# Patient Record
Sex: MALE | Race: BLACK OR AFRICAN AMERICAN | HISPANIC OR LATINO
[De-identification: names, ages, dates, MRNs, and addresses within clinical notes are randomized per-mention and may not be internally consistent; named-entity substitution may affect disease eponyms.]

---

## 2019-02-05 PROBLEM — Z00.00 ENCOUNTER FOR PREVENTIVE HEALTH EXAMINATION: Status: ACTIVE | Noted: 2019-02-05

## 2019-02-25 ENCOUNTER — RECORD ABSTRACTING (OUTPATIENT)
Age: 49
End: 2019-02-25

## 2019-02-25 DIAGNOSIS — Z78.9 OTHER SPECIFIED HEALTH STATUS: ICD-10-CM

## 2019-02-25 DIAGNOSIS — Z82.0 FAMILY HISTORY OF EPILEPSY AND OTHER DISEASES OF THE NERVOUS SYSTEM: ICD-10-CM

## 2019-02-25 DIAGNOSIS — D51.8 OTHER VITAMIN B12 DEFICIENCY ANEMIAS: ICD-10-CM

## 2019-04-26 ENCOUNTER — APPOINTMENT (OUTPATIENT)
Dept: ENDOCRINOLOGY | Facility: CLINIC | Age: 49
End: 2019-04-26
Payer: COMMERCIAL

## 2019-04-26 VITALS
WEIGHT: 245 LBS | SYSTOLIC BLOOD PRESSURE: 110 MMHG | BODY MASS INDEX: 33.18 KG/M2 | HEART RATE: 69 BPM | DIASTOLIC BLOOD PRESSURE: 80 MMHG | HEIGHT: 72 IN

## 2019-04-26 PROCEDURE — 99213 OFFICE O/P EST LOW 20 MIN: CPT

## 2019-04-26 RX ORDER — PIOGLITAZONE 30 MG/1
30 TABLET ORAL
Refills: 0 | Status: DISCONTINUED | COMMUNITY
End: 2019-04-26

## 2019-04-26 RX ORDER — VALSARTAN 80 MG/1
80 TABLET ORAL
Refills: 0 | Status: DISCONTINUED | COMMUNITY
End: 2019-04-26

## 2019-04-26 RX ORDER — PIOGLITAZONE HYDROCHLORIDE 15 MG/1
15 TABLET ORAL
Refills: 0 | Status: DISCONTINUED | COMMUNITY
End: 2019-04-26

## 2019-04-26 RX ORDER — SAXAGLIPTIN 5 MG/1
5 TABLET, FILM COATED ORAL
Refills: 0 | Status: DISCONTINUED | COMMUNITY
End: 2019-04-26

## 2019-04-26 NOTE — PHYSICAL EXAM
[Alert] : alert [No Acute Distress] : no acute distress [Well Nourished] : well nourished [Well Developed] : well developed [Normal Sclera/Conjunctiva] : normal sclera/conjunctiva [EOMI] : extra ocular movement intact [No Proptosis] : no proptosis [Thyroid Not Enlarged] : the thyroid was not enlarged [Normal Oropharynx] : the oropharynx was normal [No Thyroid Nodules] : there were no palpable thyroid nodules [No Respiratory Distress] : no respiratory distress [No Accessory Muscle Use] : no accessory muscle use [Normal Rate] : heart rate was normal  [Clear to Auscultation] : lungs were clear to auscultation bilaterally [Regular Rhythm] : with a regular rhythm [Normal S1, S2] : normal S1 and S2 [Pedal Pulses Normal] : the pedal pulses are present [No Edema] : there was no peripheral edema [Normal Bowel Sounds] : normal bowel sounds [Not Tender] : non-tender [Not Distended] : not distended [Soft] : abdomen soft [Post Cervical Nodes] : posterior cervical nodes [Anterior Cervical Nodes] : anterior cervical nodes [Axillary Nodes] : axillary nodes [Normal] : normal and non tender [Spine Straight] : spine straight [No Spinal Tenderness] : no spinal tenderness [No Stigmata of Cushings Syndrome] : no stigmata of cushings syndrome [Normal Gait] : normal gait [Normal Strength/Tone] : muscle strength and tone were normal [No Rash] : no rash [Normal Reflexes] : deep tendon reflexes were 2+ and symmetric [No Tremors] : no tremors [Oriented x3] : oriented to person, place, and time [Acanthosis Nigricans] : no acanthosis nigricans

## 2019-04-26 NOTE — HISTORY OF PRESENT ILLNESS
[FreeTextEntry1] : April 26, 2019\par \par     PCP: Dr. Brandon Faulkner c/o [facility] [place]\par             Card: Dr. Pernell orta/o [facility] \par             Eyes: Dr. Kylah Gonzales\par \par            CC: "Type 2 Diabetes" (2001) ?NALLELY   Reactive hypoglycemia\par             +Anti-HARRY 65 ab & daughter DM1\par             Low vitamin B12 (neg intrinsic factor and parietal cell ab 2014)\par             Low Vitamin D\par             (Low WBC)\par \par Gets reactive hypoglyemia\par \par Needs refill on metformin  1000 mg BID via [facility]\par OK with\par pioglitazone 15 mg in PM (lowered from 30)\par Januavia 100 mg\par glipizid ER 10 mg in PM\par \par Recent [facility] labs from\par 4/11/2019\par A1c 5.8 %\par  mg/dl \par \par \par Previous notes from [facility] appended below.\par \par  October 4, 2018\par            .\par            PCP: Dr. [facility]ulkner [facility] [place]\par             Card: Dr. Pernell orta/o [facility] \par             Eyes: Dr. Kylah Gonzales\par            CC: "Type 2 Diabetes" (2001) ?NALLELY\par             +Anti-HARRY 65 ab & daughter DM1\par             Low vitamin B12 (neg intrinsic factor and parietal cell ab 2014)\par             Low Vitamin D\par             (Low WBC)\par            .\par            His valsartan was recalled.\par            .\par            Recent [facility] A1c 6.4 % (up from 6.1)\par            urine microalbumin \par            .\par            Actos dose being decreased from 30 to 15 mg in PM\par            Januvia will stay at 100 mg daily. \par            glipizide ER 10 mg in PM\par            metformin 1000 mg BID \par            .\par            .\par            Impression: Diabetes doing very well. \par            .\par            Plan: Same Rx.\par            .Annual eye exam\par            .\par            ==\par            .\par            May 17, 2018\par            .\par            PCP: Dr. Brandon Faulkner c/o [facility] [place]\par             Card: Dr. Pernell Sindhwani c/[facility] \par             Eyes: Dr. Matt [facility]ico [facility]\par            CC: "Type 2 Diabetes" (2001) ?NALLELY\par             +Anti-HARRY 65 ab & daughter DM1\par             Low vitamin B12 (neg intrinsic factor and parietal cell ab 2014)\par             Low Vitamin D\par             (Low WBC)\par            .\par            INTERNET DOWN.\par            .\par            Doing well.\par            .\par            his phone is \par            Rx / Refills Continue Metformin HCl 1000MG, , 1 tablet twice a day \par             Continue GlipiZIDE XL 10 mg, , 1 tablet Once a day in PM \par             Continue Januvia 100 MG, , 1 tablet Once a day \par             Continue Actos 30 MG, , 1 tablet once a day in PM \par            .\par            Dec 14, 2017\par            .\par            PCP: Dr. Brandon Faulkner c/o [facility] [place]\par             Card: Dr. Pernell Kirkpatrick c/[facility] \par             Eyes: Dr. Matt [facility]ico [facility]\par            CC: "Type 2 Diabetes" (2001) ?NALLELY\par             +Anti-HARRY 65 ab & daughter DM1\par             Low vitamin B12 (neg intrinsic factor and parietal cell ab 2014)\par             Low Vitamin D\par             (Low WBC)\par            .\par            Still takiing:.\par            .\par            Actos 30 mg in PM \par            glipizide ER 10 mg in PM\par            Onglyza 5 mg daily-> Januvia 100 mg daily\par            Metformin 1000 mg BID\par            .\par            Labs Nov 30 included\par            microal ratio 5\par            glucose 131 fasting \par            creatinine 1.01 **\par            HbA1c 6.1 %\par            .\par            Impression: Since Actos decreased from 30 to 15 mg the FBS drifted up a bit. Has been too busy to test. \par            .\par            Plan: Discussed [facility].\par            Eye exam.\par            ROV 4 months. \par            .,\par            .\par            ==\par            .\par            August 10, 2017\par            .\par            PCP: Dr. Brandon Faulkner c/o [facility]\par             Card: Dr. Pernell Kirkpatrick c/[facility]\par            CC: "DM2" (2001) ?NALLELY\par             +Anti-HARRY 65 ab & daughter DM1\par             Low vitamin B12 (neg intrinsic factor and parietal cell ab 2014)\par             Low Vitamin D\par             (Low WBC)\par            .\par            .\par            Actos 30 mg in PM \par            glipizide ER 10 mg in PM\par            Onglyza 5 mg daily-> Januvia 100 mg daily\par            Metformin 1000 mg BID\par            .,\par            Takes B12, Vit D OTC daily as well. \par            .\par            Goal will be to taper off of Actos. \par            .\par            Most recent labs ([facility]) from\par            7/27/2017\par            SDV516 mg/dl\par            gastrin 31\par            gliadin ab neg\par            HbA1c 5.9 % *** (as high as 8.0 in August 2011) \par            .\par            Brings in fingerstick BS.\par            FBS .\par            Some LOW ac lunc 49, 56\par            .\par            Impression: Low AC lunch may be related to reactive to \par            oatmeal or delayed effect of PM glipizide ER.\par            .\par            Goal will be to taper off of Actos.\par            .\par            Plan: Increse exercise.\par            Decrease Actos to 15 mg.\par            ROV 3 months after labs.\par            .\par            ==\par            .\par            March 9, 2017\par            .\par            PCP: Dr. Ken Wilson c/[facility]\par             Card: Dr. Pernell Kirkpatrick c/[facility]\par            CC: "DM2" (2001) ?NALLELY\par             +Anti-HARRY 65 ab & daughter DM1\par             Low vitamin B12 (neg intrinsic factor and parietal cell ab 2014)\par             (Low WBC)\par            .\par            Medications currently include:\par            .\par            Actos 30 mg in PM\par            glipizide ER 10 mg in PM\par            Onglyza 5 mg daily-\par            Metformin 1000 mg BID\par            .\par            Also taking metoprolol 25 mg in PM\par            .\par            Impression: Doing well.\par            Fingerstick BS in good range.\par            Hopefully this will be verified by A1c\par            .\par            Plan: Annual eye exam\par            .\par            ==\par            .\par            Nov 10, 2016\par            .\par            PCP: Dr. Ken Wilson c/[facility]\par            CC: "DM2" (2001) ?NALLELY\par             +Anti-HARRY 65 ab & daughter DM1\par             Low vitamin B12 (neg intrinsic factor and parietal cell ab 2014)\par             (Low WBC)\par            .\par            Had panic attacks\par            Told of low K\par            Got too low with 2 glip in PM \par            .\par            Needs Onglyza. \par            On vit D and B12\par            .\par            Impression: Stable.\par            .\par            Plan: Annual eye exam\par            Continue:\par            Actos 30 mg in PM\par            glipizide ER 10 mg in PM\par            Onglyza 5 mg daily\par            Metformin 1000 mg BID\par            .\par            ==\par            .\par            July 21, 2016\par            .\par            PCP: Dr. Ken orta/o [facility]\par            CC: "DM2" (2001) ?NALLELY\par             +Anti-HARRY 65 ab & daughter DM1\par             Low vitamin B12 (neg intrinsic factor and parietal cell ab 2014)\par             (Low WBC)\par            .\par            Last visit advised him to increase PM glipizide ER 10 mg to two pills in AM, but he did not. \par            Did not bring records to this visit. (but he reports sugars in good range on current Rx.\par            .\par            Impression: Recent A1c of 6.2% suggests good control.\par            .\par            Plan: Continue glipizide ER 10 mg at one pill in PM along with the other medications for diabetes. \par            Updated A1c before he returns in3-4 mons. \par            .\par            ==\par            .\par            March 10, 2016\par            .\par            PCP: Dr. Ken orta/o [facility]\par            CC: "DM2" (2001)\par             +Anti-HARRY 65 ab & daughter DM1\par             Low vitamin B12 (neg intrinsic factor and parietal cell ab 2014)\par             (Low WBC)\par            3/3/2016 [facility] labs included\par            .\par             mg/dl\par            Fructosamine 327 (190-270)\par            .\par            HbA1c 7.3 %\par            .\par            Actos 30 mg in PM\par            glipizide ER 10 mg in PM\par            Onglyza 5 mg daily\par            Metformin 1000 mg BID\par            .\par            .\par            Plan: Next labs, check B12, Vit D, WBC, A1c\par            Because of elevated FBS can increase glipizide ER 10 mg to\par            two pills in PM. \par            .\par            "I feel fine, lost some weight...." -Lost 15 lbs since April. \par            .\par            .\par            ==\par            .\par            November 12, 2015\par            .\par            PCP: Dr. Ken Wilson c/o [facility]\par            CC: "DM2" (2001)\par             +Anti-HARRY 65 ab & daughter DM1\par             Low vitamin B12 (neg intrinsic factor and parietal cell ab 2014)\par             (Low WBC)\par            .\par            Labs July 2 included A1c 7.2 %\par            B12 155 (180-9000)\par            WBC 2.4 \par            .\par            Actos 30 mg in PM\par            glipizide ER 5 mg in PM\par            Onglyza 5 mg daily\par            Metformin 1000 mg BID\par            .\par            Impression: 15 yo daughter keeping him busy. She has insulin pump.\par            He has not been testing his own sugars.\par            .\par            Plan: Updated A1c. -jh\par            .\par            .\par            ==\par            .\par            July 2, 2015\par            .\par            .\par            PCP: Dr. 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 Not clear if rise in the FBS is a sign of this, but will increase PM glipizide to 10 mg and ROV in 4 months.\par            .\par            .===\par            Nov 17, 2014\par            PCP: Dr. 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 \par            .\par            Because of +HARRY 65 and daughter's DM1, I encouraged him to\par            see Dr. Imtiaz De La O at [facility] for advice to decrease chances of evolution from DM2 to DM1. He reports that Dr. De La O is not in his network. I will check with [facility] faculty for their advice:\par            [email or web address]\par            [phone]\par            .\par            Remains on:\par            Actos 30 mg in PM\par            glipizide ER 5 mg in PM\par            Onglyza 5 mg daily\par            Metformin 1000 mg BID.\par            .\par            Impression: Doing well. Last labs in May 6.1%\par            .\par            Plan: Same Rx. ROV in 4 months after updated labs. Thank you.\par            .\par            ===\par            .\par            May 27, 2014\par            PCP: Dr. Wilson\par            CC: DM2 (2001);\par            .\par            Last note appended below.\par            On metformin 1000 mg BID\par            Onglyza 5 mg daily\par            glipizide XR 5 mg\par            Actos 30 mg in PM.\par            .\par            Fingerstick BS in good range. FBS about 110 mg/dl average.\par            Sees Ophthalmology at [facility]. \par            .\par            Impression: His daughter has Type 1 Diabetes. His recent blood tests show post breakfast BS of 131 and C-peptide of 5.07 (0.8-3.1) at [facility] suggesting he currently makes considerable insulin and is insulin resistant. 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 For that reason, I suggested to him that he may benefit from evaluation at [facility] (Dr. Vegas fax [phone]) for possible study or strategy to decrease progression to DM1 and he will ask you advice for a referral in that regard. The Anti parietal cell ab is negative. B12 is borderline. \par             Thank you. -jh\par            .\par            =========\par            Dec 16, 2013\par            PCP: Dr. Bhardwaj/Dr. Faulkner\par            CC: DM2, since 2001, low B12\par            .\par            13 yo daughter has DM1 diagnosed age 9 [address] Erickson. \par            He stopped Actos prescriibedd by Dr. Silva b/o concern about bladder cancer. Dr. Silva had him on 30 mg and I restarted at 15 mg, but he wants the 30 mg Actos now that he is back on it.\par            .\par            I switched him to metformin XR 1000 mg BID, but he prefers the regular metformin as it is smaller and less expensive.\par            .\par            Because the FBS (and A1c) have drifted up a bit, I have advised he increase the PM glipizide 2.5 mg XR to 5 mg XR.\par            .\par            He will remain on 5 mg Onglyza every AM.\par            .\par            B12 is only 202. \par            Imp: Lower B12 may be a metformin effect. \par            .\par            Plan: As above.\par            .\par            ====\par            August 19, 2013\par            PCP: Dr. Ken Wilson/Dr. Faulkner\par            CC: DM2\par            .\par            Recent A1c again 5.9%\par            B12 again low\par            .\par            On Onglyza 5 mg\par            Actos 15 in PM\par            Metformin 1000 mg BID\par            glipizide ER 2.5 mg in PM\par            .\par            Brought NO records. \par            Impression: BS in good range.\par            Plan: Same Rx. ROV 4 months.\par            Get B12 shot at [facility]. \par            .\par            ====\par            April 1, 2013 CC: Diabetes \par            Meds the same.\par            A1c is 5.9% B12 183.\par            Doing well. Highest BS is 200 after instant oatmeal.\par            Eyes checked recently at [facility].\par            Feels well.\par            He will hand deliver labs to Dr. Wilson\par            .\par            .\par            ++++++\par            Visits for DM2. Meds the same. Recent A1c 6.1%. Had recent eye exam at [facility]. Feels well. Previous note appended below:\par            .s. \par            .\par            "July 25, 2012 Re: Michael Oscar [birth year]\par            Attn: Dr. Ken Wilson\par            Mr. Moore is 40 yo, father of a 21 and 15 yo, works at the [employer] in  and he returns regarding DM diagnosed in 2001. He is currently taking:\par            metformin 1000 mg BID\par            Onglyza 5 mg qAM\par            Actos 15 mg qPM \par            glipizide 2.5 mg qPM \par            Recent HbA1c is 6.1 (down from 8.0 in August but up from 5.8% last visit). However he did not bring in his records today. \par            No foot discomfort; eye exam a few month ago at [facility]. \par            Weight today is 275 lbs fully clothed, 6 ft tall.\par            Impression: Diabetes appears to be under excellent control\par            Plan: Same Rx. \par            Sincerely,\par            James Hellerman MD\par            Endocrinology [address] [place] [id number]".

## 2019-04-26 NOTE — ASSESSMENT
[FreeTextEntry1] : ~\par FBS drifted up a tad after pioglitazone from 30 to 15.\par Sees podiatrist at [facility].\par ROV in October after labs

## 2019-07-31 ENCOUNTER — RX RENEWAL (OUTPATIENT)
Age: 49
End: 2019-07-31

## 2019-10-17 ENCOUNTER — APPOINTMENT (OUTPATIENT)
Dept: ENDOCRINOLOGY | Facility: CLINIC | Age: 49
End: 2019-10-17
Payer: COMMERCIAL

## 2019-10-17 VITALS
HEIGHT: 72 IN | SYSTOLIC BLOOD PRESSURE: 120 MMHG | BODY MASS INDEX: 33.86 KG/M2 | HEART RATE: 72 BPM | DIASTOLIC BLOOD PRESSURE: 80 MMHG | WEIGHT: 250 LBS

## 2019-10-17 PROCEDURE — 99214 OFFICE O/P EST MOD 30 MIN: CPT

## 2019-10-17 NOTE — PHYSICAL EXAM
[Alert] : alert [No Acute Distress] : no acute distress [Well Developed] : well developed [Well Nourished] : well nourished [Normal Sclera/Conjunctiva] : normal sclera/conjunctiva [No Proptosis] : no proptosis [EOMI] : extra ocular movement intact [Normal Oropharynx] : the oropharynx was normal [Thyroid Not Enlarged] : the thyroid was not enlarged [No Respiratory Distress] : no respiratory distress [No Accessory Muscle Use] : no accessory muscle use [No Thyroid Nodules] : there were no palpable thyroid nodules [Normal Rate] : heart rate was normal  [Normal S1, S2] : normal S1 and S2 [Clear to Auscultation] : lungs were clear to auscultation bilaterally [Pedal Pulses Normal] : the pedal pulses are present [Regular Rhythm] : with a regular rhythm [No Edema] : there was no peripheral edema [No Spinal Tenderness] : no spinal tenderness [No Stigmata of Cushings Syndrome] : no stigmata of cushings syndrome [Spine Straight] : spine straight [Normal Gait] : normal gait [No Rash] : no rash [No Tremors] : no tremors [Oriented x3] : oriented to person, place, and time [Normal Insight/Judgement] : insight and judgment were intact [Normal Affect] : the affect was normal [Normal Mood] : the mood was normal [Acanthosis Nigricans] : no acanthosis nigricans

## 2019-10-17 NOTE — HISTORY OF PRESENT ILLNESS
[FreeTextEntry1] : Oct 17, 2019\par PCP: Dr. Brandon Faulkner c/o [facility]\par             Card: Dr. Pernell orta/o [facility] \par             Eyes: Dr. Kylah Gonzales\par \par            CC: "Type 2 Diabetes" (2001) ?NALLELY   Reactive hypoglycemia\par             +Anti-HARRY 65 ab & daughter DM1\par             Low vitamin B12 (neg intrinsic factor and parietal cell ab 2014)\par             Low Vitamin D\par             (Low WBC)\par \par Feels well.\par Recent CBC at [facility] shows Hct 36and at [facility] in July 2015 Hct was 46.8.\par He will discuss with Dr. Faulkner at upcoming visit.\par ROV here for diabetes in February 2020.   \par \par \par \par April 26, 2019\par \par     PCP: Dr. Brandon Faulkner c/o [facility]\par             Card: Dr. Pernell orta/o [facility] \par             Eyes: Dr. Kylah Gonzales\par \par            CC: "Type 2 Diabetes" (2001) ?NALLELY   Reactive hypoglycemia\par             +Anti-HARRY 65 ab & daughter DM1\par             Low vitamin B12 (neg intrinsic factor and parietal cell ab 2014)\par             Low Vitamin D\par             (Low WBC)\par \par Gets reactive hypoglyemia\par \par Needs refill on metformin  1000 mg BID via [facility]\par OK with\par pioglitazone 15 mg in PM (lowered from 30)\par Januavia 100 mg\par glipizid ER 10 mg in PM\par \par Recent [facility] labs from\par 4/11/2019\par A1c 5.8 %\par  mg/dl \par \par \par Previous notes from [employer] appended below.\par \par  October 4, 2018\par            .\par            PCP: Dr. Brandon Faulkner c/o [facility]\par             Card: Dr. Pernell orta/o [facility] \par             Eyes: Dr. Kylah Gonzales\par            CC: "Type 2 Diabetes" (2001) ?NALLELY\par             +Anti-HARRY 65 ab & daughter DM1\par             Low vitamin B12 (neg intrinsic factor and parietal cell ab 2014)\par             Low Vitamin D\par             (Low WBC)\par            .\par            His valsartan was recalled.\par            .\par            Recent [facility] A1c 6.4 % (up from 6.1)\par            urine microalbumin \par            .\par            Actos dose being decreased from 30 to 15 mg in PM\par            Januvia will stay at 100 mg daily. \par            glipizide ER 10 mg in PM\par            metformin 1000 mg BID \par            .\par            .\par            Impression: Diabetes doing very well. \par            .\par            Plan: Same Rx.\par            .Annual eye exam\par            .\par            ==\par            .\par            May 17, 2018\par            .\par            PCP: Dr. Brandon Faulkner c/o [facility]\par             Card: Dr. Pernell Kirkpatrick c/[facility] \par             Eyes: Dr. Matt rico [facility]\par            CC: "Type 2 Diabetes" (2001) ?NALLELY\par             +Anti-HARRY 65 ab & daughter DM1\par             Low vitamin B12 (neg intrinsic factor and parietal cell ab 2014)\par             Low Vitamin D\par             (Low WBC)\par            .\par            INTERNET DOWN.\par            .\par            Doing well.\par            .\par            his phone is \par            Rx / Refills Continue Metformin HCl 1000MG, , 1 tablet twice a day \par             Continue GlipiZIDE XL 10 mg, , 1 tablet Once a day in PM \par             Continue Januvia 100 MG, , 1 tablet Once a day \par             Continue Actos 30 MG, , 1 tablet once a day in PM \par            .\par            Dec 14, 2017\par            .\par            PCP: Dr. Brandon Faulkner c/o [facility]\par             Card: Dr. Pernell Kirkpatrick c/[facility] \par             Eyes: Dr. Matt rico [facility]\par            CC: "Type 2 Diabetes" (2001) ?NALLELY\par             +Anti-HARRY 65 ab & daughter DM1\par             Low vitamin B12 (neg intrinsic factor and parietal cell ab 2014)\par             Low Vitamin D\par             (Low WBC)\par            .\par            Still takiing:.\par            .\par            Actos 30 mg in PM \par            glipizide ER 10 mg in PM\par            Onglyza 5 mg daily-> Januvia 100 mg daily\par            Metformin 1000 mg BID\par            .\par            Labs Nov 30 included\par            microal ratio 5\par            glucose 131 fasting \par            creatinine 1.01 **\par            HbA1c 6.1 %\par            .\par            Impression: Since Actos decreased from 30 to 15 mg the FBS drifted up a bit. Has been too busy to test. \par            .\par            Plan: Discussed [facility].\par            Eye exam.\par            ROV 4 months. \par            .,\par            .\par            ==\par            .\par            August 10, 2017\par            .\par            PCP: Dr. Brandon Faulkner c/[facility]\par             Card: Dr. Pernell Kirkpatrick c/[facility]\par            CC: "DM2" (2001) ?NALLELY\par             +Anti-HARRY 65 ab & daughter DM1\par             Low vitamin B12 (neg intrinsic factor and parietal cell ab 2014)\par             Low Vitamin D\par             (Low WBC)\par            .\par            .\par            Actos 30 mg in PM \par            glipizide ER 10 mg in PM\par            Onglyza 5 mg daily-> Januvia 100 mg daily\par            Metformin 1000 mg BID\par            .,\par            Takes B12, Vit D OTC daily as well. \par            .\par            Goal will be to taper off of Actos. \par            .\par            Most recent labs ([facility]) from\par            7/27/2017\par            [id number] mg/dl\par            gastrin 31\par            gliadin ab neg\par            HbA1c 5.9 % *** (as high as 8.0 in August 2011) \par            .\par            Brings in fingerstick BS.\par            FBS .\par            Some LOW ac lunc 49, 56\par            .\par            Impression: Low AC lunch may be related to reactive to \par            oatmeal or delayed effect of PM glipizide ER.\par            .\par            Goal will be to taper off of Actos.\par            .\par            Plan: Increse exercise.\par            Decrease Actos to 15 mg.\par            ROV 3 months after labs.\par            .\par            ==\par            .\par            March 9, 2017\par            .\par            PCP: Dr. Ken Wilson c/[facility]\par             Card: Dr. Pernell Kirkpatrick c/o [facility]\par            CC: "DM2" (2001) ?NALLELY\par             +Anti-HARRY 65 ab & daughter DM1\par             Low vitamin B12 (neg intrinsic factor and parietal cell ab 2014)\par             (Low WBC)\par            .\par            Medications currently include:\par            .\par            Actos 30 mg in PM\par            glipizide ER 10 mg in PM\par            Onglyza 5 mg daily-\par            Metformin 1000 mg BID\par            .\par            Also taking metoprolol 25 mg in PM\par            .\par            Impression: Doing well.\par            Fingerstick BS in good range.\par            Hopefully this will be verified by A1c\par            .\par            Plan: Annual eye exam\par            .\par            ==\par            .\par            Nov 10, 2016\par            .\par            PCP: Dr. Ken Wilson [facility]\par            CC: "DM2" (2001) ?NALLELY\par             +Anti-HARRY 65 ab & daughter DM1\par             Low vitamin B12 (neg intrinsic factor and parietal cell ab 2014)\par             (Low WBC)\par            .\par            Had panic attacks\par            Told of low K\par            Got too low with 2 glip in PM \par            .\par            Needs Onglyza. \par            On vit D and B12\par            .\par            Impression: Stable.\par            .\par            Plan: Annual eye exam\par            Continue:\par            Actos 30 mg in PM\par            glipizide ER 10 mg in PM\par            Onglyza 5 mg daily\par            Metformin 1000 mg BID\par            .\par            ==\par            .\par            July 21, 2016\par            .\par            PCP: Dr. Ken orta/o [facility]\par            CC: "DM2" (2001) ?NALLELY\par             +Anti-HARRY 65 ab & daughter DM1\par             Low vitamin B12 (neg intrinsic factor and parietal cell ab 2014)\par             (Low WBC)\par            .\par            Last visit advised him to increase PM glipizide ER 10 mg to two pills in AM, but he did not. \par            Did not bring records to this visit. (but he reports sugars in good range on current Rx.\par            .\par            Impression: Recent A1c of 6.2% suggests good control.\par            .\par            Plan: Continue glipizide ER 10 mg at one pill in PM along with the other medications for diabetes. \par            Updated A1c before he returns in3-4 mons. \par            .\par            ==\par            .\par            March 10, 2016\par            .\par            PCP: Dr. Ken orta/o [facility]\par            CC: "DM2" (2001)\par             +Anti-HARRY 65 ab & daughter DM1\par             Low vitamin B12 (neg intrinsic factor and parietal cell ab 2014)\par             (Low WBC)\par            3/3/2016 [facility] labs included\par            .\par             mg/dl\par            Fructosamine 327 (190-270)\par            .\par            HbA1c 7.3 %\par            .\par            Actos 30 mg in PM\par            glipizide ER 10 mg in PM\par            Onglyza 5 mg daily\par            Metformin 1000 mg BID\par            .\par            .\par            Plan: Next labs, check B12, Vit D, WBC, A1c\par            Because of elevated FBS can increase glipizide ER 10 mg to\par            two pills in PM. \par            .\par            "I feel fine, lost some weight...." -Lost 15 lbs since April. \par            .\par            .\par            ==\par            .\par            November 12, 2015\par            .\par            PCP: Dr. Ken orta/[facility]\par            CC: "DM2" (2001)\par             +Anti-HARRY 65 ab & daughter DM1\par             Low vitamin B12 (neg intrinsic factor and parietal cell ab 2014)\par             (Low WBC)\par            .\par            Labs July 2 included A1c 7.2 %\par            B12 155 (180-9000)\par            WBC 2.4 \par            .\par            Actos 30 mg in PM\par            glipizide ER 5 mg in PM\par            Onglyza 5 mg daily\par            Metformin 1000 mg BID\par            .\par            Impression: 17 yo daughter keeping him busy. She has insulin pump.\par            He has not been testing his own sugars.\par            .\par            Plan: Updated A1c. -jh\par            .\par            .\par            ==\par            .\par            July 2, 2015\par            .\par            .\par            PCP: Dr. 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 Not clear if rise in the FBS is a sign of this, but will increase PM glipizide to 10 mg and ROV in 4 months.\par            .\par            .===\par            Nov 17, 2014\par            PCP: Dr. 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 \par            .\par            Because of +HARRY 65 and daughter's DM1, I encouraged him to\par            see Dr. Imtiaz De La O at [facility] for advice to decrease chances of evolution from DM2 to DM1. He reports that Dr. De La O is not in his network. I will check with [facility] faculty for their advice:\par            [email or web address]\par            [phone]\par            .\par            Remains on:\par            Actos 30 mg in PM\par            glipizide ER 5 mg in PM\par            Onglyza 5 mg daily\par            Metformin 1000 mg BID.\par            .\par            Impression: Doing well. Last labs in May 6.1%\par            .\par            Plan: Same Rx. ROV in 4 months after updated labs. Thank you.\par            .\par            ===\par            .\par            May 27, 2014\par            PCP: Dr. Wilson\par            CC: DM2 (2001);\par            .\par            Last note appended below.\par            On metformin 1000 mg BID\par            Onglyza 5 mg daily\par            glipizide XR 5 mg\par            Actos 30 mg in PM.\par            .\par            Fingerstick BS in good range. FBS about 110 mg/dl average.\par            Sees Op[facility] at [facility]. \par            .\par            Impression: His daughter has Type 1 Diabetes. His recent blood tests show post breakfast BS of 131 and C-peptide of 5.07 (0.8-3.1) at [facility] suggesting he currently makes considerable insulin and is insulin resistant. 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 For that reason, I suggested to him that he may benefit from evaluation at [facility] (Dr. Vegas fax [phone]) for possible study or strategy to decrease progression to DM1 and he will ask you advice for a referral in that regard. The Anti parietal cell ab is negative. B12 is borderline. \par             Thank you. -jh\par            .\par            =========\par            Dec 16, 2013\par            PCP: Dr. Bhardwaj/Dr. Faulkner\par            CC: DM2, since 2001, low B12\par            .\par            13 yo daughter has DM1 diagnosed age 9 [address] Erickson. \par            He stopped Actos prescriibedd by Dr. Silva b/o concern about bladder cancer. Dr. Silva had him on 30 mg and I restarted at 15 mg, but he wants the 30 mg Actos now that he is back on it.\par            .\par            I switched him to metformin XR 1000 mg BID, but he prefers the regular metformin as it is smaller and less expensive.\par            .\par            Because the FBS (and A1c) have drifted up a bit, I have advised he increase the PM glipizide 2.5 mg XR to 5 mg XR.\par            .\par            He will remain on 5 mg Onglyza every AM.\par            .\par            B12 is only 202. \par            Imp: Lower B12 may be a metformin effect. \par            .\par            Plan: As above.\par            .\par            ====\par            August 19, 2013\par            PCP: Dr. Ken Wilson/Dr. Faulkner\par            CC: DM2\par            .\par            Recent A1c again 5.9%\par            B12 again low\par            .\par            On Onglyza 5 mg\par            Actos 15 in PM\par            Metformin 1000 mg BID\par            glipizide ER 2.5 mg in PM\par            .\par            Brought NO records. \par            Impression: BS in good range.\par            Plan: Same Rx. ROV 4 months.\par            Get B12 shot at [facility]. \par            .\par            ====\par            April 1, 2013 CC: Diabetes \par            Meds the same.\par            A1c is 5.9% B12 183.\par            Doing well. Highest BS is 200 after instant oatmeal.\par            Eyes checked recently at [facility].\par            Feels well.\par            He will hand deliver labs to Dr. Wilson\par            .\par            .\par            ++++++\par            Visits for DM2. Meds the same. Recent A1c 6.1%. Had recent eye exam at [facility]. Feels well. Previous note appended below:\par            .s. \par            .\par            "July 25, 2012 Re: Michael Moore [birth year]\par            Attn: Dr. Ken Wilson\par            Mr. Moore is 42 yo, father of a 21 and 15 yo, works at the [employer] in  and he returns regarding DM diagnosed in 2001. He is currently taking:\par            metformin 1000 mg BID\par            Onglyza 5 mg qAM\par            Actos 15 mg qPM \par            glipizide 2.5 mg qPM \par            Recent HbA1c is 6.1 (down from 8.0 in August but up from 5.8% last visit). However he did not bring in his records today. \par            No foot discomfort; eye exam a few month ago at [facility]. \par            Weight today is 275 lbs fully clothed, 6 ft tall.\par            Impression: Diabetes appears to be under excellent control\par            Plan: Same Rx. \par            Sincerely,\par            James Hellerman MD\par            Endocrinology [address] [place] [id number]".

## 2020-04-02 ENCOUNTER — TRANSCRIPTION ENCOUNTER (OUTPATIENT)
Age: 50
End: 2020-04-02

## 2020-04-30 ENCOUNTER — APPOINTMENT (OUTPATIENT)
Dept: ENDOCRINOLOGY | Facility: CLINIC | Age: 50
End: 2020-04-30
Payer: COMMERCIAL

## 2020-04-30 PROCEDURE — 99214 OFFICE O/P EST MOD 30 MIN: CPT | Mod: 95

## 2020-04-30 RX ORDER — GLIPIZIDE 10 MG/1
10 TABLET, FILM COATED, EXTENDED RELEASE ORAL
Qty: 90 | Refills: 4 | Status: DISCONTINUED | COMMUNITY
Start: 2019-07-31 | End: 2020-04-30

## 2020-04-30 NOTE — ASSESSMENT
[FreeTextEntry1] : Doing an excellent job of monitoring diabetes, keeping to appropriate diet.\par A1c in good range.\par ROV 6 months.

## 2020-04-30 NOTE — HISTORY OF PRESENT ILLNESS
[Home] : at home, [unfilled] , at the time of the visit. [Medical Office: ([facility])___] : at the medical office located in  [Patient] : the patient [Self] : self [FreeTextEntry1] : Apr 30, 2020  VideoChat [employer]   android  [phone]  \par \par PCP: Dr. Brandon Faulkner c/o [facility] [place]\par             Card: Dr. Pernell Kirkpatrick c/o [facility] \par             Eyes: Dr. Kylah Gonzales\par \par            CC: "Type 2 Diabetes" (2001) ?NALLELY   Reactive hypoglycemia\par             +Anti-HARRY 65 ab & daughter DM1\par             Low vitamin B12 (neg intrinsic factor and parietal cell ab 2014)\par             Low Vitamin D\par             (Low WBC)\par \par Working from home \par Recent [facility]  by Dr. Faulkner included.\par 2/25/2020 included\par glucose 108 mg/dl  - fasting \par creat 0.99\par ca 9.4\par TP 6.8\par alb 4.4\par low alk phos ***\par WBC 4.1 \par Hct 35.9  ****\par MCV  87.6 \par \par A1c  6.3 ***\par HDL   46\par tri   89\par LDL  65 on atrovastatin\par \par urine micro   9   - ratio\par iron - nl \par ferritin 89 \par \par TSH 1.25\par PSA  0.5 \par \par For diabetes, he is taking:\par \par  metformin  1000 mg BID \par pioglitazone 15 mg in PM (lowered from 30)\par Januavia 100 mg  - needs refill via [place] \par glipizide ER 10 mg in PM\par \par \par Oct 17, 2019\par PCP: Dr. Brandon Faulkner c/o [facility] [place]\par             Card: Dr. Pernell Kirkpatrick [facility]/o [facility] \par             Eyes: Dr. Kylah Gonzales\par \par            CC: "Type 2 Diabetes" (2001) ?NALLELY   Reactive hypoglycemia\par             +Anti-HARRY 65 ab & daughter DM1\par             Low vitamin B12 (neg intrinsic factor and parietal cell ab 2014)\par             Low Vitamin D\par             (Low WBC)\par \par Feels well.\par Recent CBC at [facility] shows Hct 36and at [place] in July 2015 Hct was 46.8.\par He will discuss with Dr. Faulkner at upcoming visit.\par ROV here for diabetes in February 2020.   \par \par \par \par April 26, 2019\par \par     PCP: Dr. Brandon Faulkner c/o [facility] [place]\par             Card: Dr. Pernell orta/o [facility] \par             Eyes: Dr. Kylah Gonzales\par \par            CC: "Type 2 Diabetes" (2001) ?NALLELY   Reactive hypoglycemia\par             +Anti-HARRY 65 ab & daughter DM1\par             Low vitamin B12 (neg intrinsic factor and parietal cell ab 2014)\par             Low Vitamin D\par             (Low WBC)\par \par Gets reactive hypoglyemia\par \par Needs refill on metformin  1000 mg BID via [facility]\par OK with\par pioglitazone 15 mg in PM (lowered from 30)\par Januavia 100 mg\par glipizid ER 10 mg in PM\par \par Recent [facility] labs from\par 4/11/2019\par A1c 5.8 %\par  mg/dl \par \par \par Previous notes from [employer] appended below.\par \par  October 4, 2018\par            .\par            PCP: Dr. Brandon Faulkner [facility] [place]\par             Card: Dr. Pernell orta/o [facility] \par             Eyes: Dr. Kylah Gonzales\par            CC: "Type 2 Diabetes" (2001) ?NALLELY\par             +Anti-HARRY 65 ab & daughter DM1\par             Low vitamin B12 (neg intrinsic factor and parietal cell ab 2014)\par             Low Vitamin D\par             (Low WBC)\par            .\par            His valsartan was recalled.\par            .\par            Recent [facility] A1c 6.4 % (up from 6.1)\par            urine microalbumin \par            .\par            Actos dose being decreased from 30 to 15 mg in PM\par            Januvia will stay at 100 mg daily. \par            glipizide ER 10 mg in PM\par            metformin 1000 mg BID \par            .\par            .\par            Impression: Diabetes doing very well. \par            .\par            Plan: Same Rx.\par            .Annual eye exam\par            .\par            ==\par            .\par            May 17, 2018\par            .\par            PCP: Dr. Brandon Faulkner c/o [facility] [place]\par             Card: Dr. Pernell orta/o [facility] \par             Eyes: Dr. Matt rico [facility]\par            CC: "Type 2 Diabetes" (2001) ?NLALELY\par             +Anti-HARRY 65 ab & daughter DM1\par             Low vitamin B12 (neg intrinsic factor and parietal cell ab 2014)\par             Low Vitamin D\par             (Low WBC)\par            .\par            INTERNET DOWN.\par            .\par            Doing well.\par            .\par            his phone is \par            Rx / Refills Continue Metformin HCl 1000MG, , 1 tablet twice a day \par             Continue GlipiZIDE XL 10 mg, , 1 tablet Once a day in PM \par             Continue Januvia 100 MG, , 1 tablet Once a day \par             Continue Actos 30 MG, , 1 tablet once a day in PM \par            .\par            Dec 14, 2017\par            .\par            PCP: Dr. Brandon Faulkner c/o [facility] [place]\par             Card: Dr. Pernell orta/[facility] \par             Eyes: Dr. Matt rico [facility]\par            CC: "Type 2 Diabetes" (2001) ?NALLELY\par             +Anti-HARRY 65 ab & daughter DM1\par             Low vitamin B12 (neg intrinsic factor and parietal cell ab 2014)\par             Low Vitamin D\par             (Low WBC)\par            .\par            Still takiing:.\par            .\par            Actos 30 mg in PM \par            glipizide ER 10 mg in PM\par            Onglyza 5 mg daily-> Januvia 100 mg daily\par            Metformin 1000 mg BID\par            .\par            Labs Nov 30 included\par            microal ratio 5\par            glucose 131 fasting \par            creatinine 1.01 **\par            HbA1c 6.1 %\par            .\par            Impression: Since Actos decreased from 30 to 15 mg the FBS drifted up a bit. Has been too busy to test. \par            .\par            Plan: Discussed [facility].\par            Eye exam.\par            ROV 4 months. \par            .,\par            .\par            ==\par            .\par            August 10, 2017\par            .\par            PCP: Dr. Brandon Faulkner c/o [facility]\par             Card: Dr. Pernell orta/o [facility]\par            CC: "DM2" (2001) ?NALLELY\par             +Anti-HARRY 65 ab & daughter DM1\par             Low vitamin B12 (neg intrinsic factor and parietal cell ab 2014)\par             Low Vitamin D\par             (Low WBC)\par            .\par            .\par            Actos 30 mg in PM \par            glipizide ER 10 mg in PM\par            Onglyza 5 mg daily-> Januvia 100 mg daily\par            Metformin 1000 mg BID\par            .,\par            Takes B12, Vit D OTC daily as well. \par            .\par            Goal will be to taper off of Actos. \par            .\par            Most recent labs ([facility]) from\par            7/27/2017\par            [id number] mg/dl\par            gastrin 31\par            gliadin ab neg\par            HbA1c 5.9 % *** (as high as 8.0 in August 2011) \par            .\par            Brings in fingerstick BS.\par            FBS .\par            Some LOW ac lunc 49, 56\par            .\par            Impression: Low AC lunch may be related to reactive to \par            oatmeal or delayed effect of PM glipizide ER.\par            .\par            Goal will be to taper off of Actos.\par            .\par            Plan: Increse exercise.\par            Decrease Actos to 15 mg.\par            ROV 3 months after labs.\par            .\par            ==\par            .\par            March 9, 2017\par            .\par            PCP: Dr. Ken Wilson c/o [facility]\par             Card: Dr. Pernell Kirkpatrick [facility]\par            CC: "DM2" (2001) ?NALLELY\par             +Anti-HARRY 65 ab & daughter DM1\par             Low vitamin B12 (neg intrinsic factor and parietal cell ab 2014)\par             (Low WBC)\par            .\par            Medications currently include:\par            .\par            Actos 30 mg in PM\par            glipizide ER 10 mg in PM\par            Onglyza 5 mg daily-\par            Metformin 1000 mg BID\par            .\par            Also taking metoprolol 25 mg in PM\par            .\par            Impression: Doing well.\par            Fingerstick BS in good range.\par            Hopefully this will be verified by A1c\par            .\par            Plan: Annual eye exam\par            .\par            ==\par            .\par            Nov 10, 2016\par            .\par            PCP: Dr. Ken Wilson [facility]\par            CC: "DM2" (2001) ?NALLELY\par             +Anti-HARRY 65 ab & daughter DM1\par             Low vitamin B12 (neg intrinsic factor and parietal cell ab 2014)\par             (Low WBC)\par            .\par            Had panic attacks\par            Told of low K\par            Got too low with 2 glip in PM \par            .\par            Needs Onglyza. \par            On vit D and B12\par            .\par            Impression: Stable.\par            .\par            Plan: Annual eye exam\par            Continue:\par            Actos 30 mg in PM\par            glipizide ER 10 mg in PM\par            Onglyza 5 mg daily\par            Metformin 1000 mg BID\par            .\par            ==\par            .\par            July 21, 2016\par            .\par            PCP: Dr. Ken Wilson [facility]\par            CC: "DM2" (2001) ?NALLELY\par             +Anti-HARRY 65 ab & daughter DM1\par             Low vitamin B12 (neg intrinsic factor and parietal cell ab 2014)\par             (Low WBC)\par            .\par            Last visit advised him to increase PM glipizide ER 10 mg to two pills in AM, but he did not. \par            Did not bring records to this visit. (but he reports sugars in good range on current Rx.\par            .\par            Impression: Recent A1c of 6.2% suggests good control.\par            .\par            Plan: Continue glipizide ER 10 mg at one pill in PM along with the other medications for diabetes. \par            Updated A1c before he returns in3-4 mons. \par            .\par            ==\par            .\par            March 10, 2016\par            .\par            PCP: Dr. Ken Wilson [facility]\par            CC: "DM2" (2001)\par             +Anti-HARRY 65 ab & daughter DM1\par             Low vitamin B12 (neg intrinsic factor and parietal cell ab 2014)\par             (Low WBC)\par            3/3/2016 [facility] labs included\par            .\par             mg/dl\par            Fructosamine 327 (190-270)\par            .\par            HbA1c 7.3 %\par            .\par            Actos 30 mg in PM\par            glipizide ER 10 mg in PM\par            Onglyza 5 mg daily\par            Metformin 1000 mg BID\par            .\par            .\par            Plan: Next labs, check B12, Vit D, WBC, A1c\par            Because of elevated FBS can increase glipizide ER 10 mg to\par            two pills in PM. \par            .\par            "I feel fine, lost some weight...." -Lost 15 lbs since April. \par            .\par            .\par            ==\par            .\par            November 12, 2015\par            .\par            PCP: Dr. Ken Wilson [facility]\par            CC: "DM2" (2001)\par             +Anti-HARRY 65 ab & daughter DM1\par             Low vitamin B12 (neg intrinsic factor and parietal cell ab 2014)\par             (Low WBC)\par            .\par            Labs July 2 included A1c 7.2 %\par            B12 155 (180-9000)\par            WBC 2.4 \par            .\par            Actos 30 mg in PM\par            glipizide ER 5 mg in PM\par            Onglyza 5 mg daily\par            Metformin 1000 mg BID\par            .\par            Impression: 17 yo daughter keeping him busy. She has insulin pump.\par            He has not been testing his own sugars.\par            .\par            Plan: Updated A1c. -jh\par            .\par            .\par            ==\par            .\par            July 2, 2015\par            .\par            .\par            PCP: Dr. 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 Not clear if rise in the FBS is a sign of this, but will increase PM glipizide to 10 mg and ROV in 4 months.\par            .\par            .===\par            Nov 17, 2014\par            PCP: Dr. 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 \par            .\par            Because of +HARRY 65 and daughter's DM1, I encouraged him to\par            see Dr. Imtiaz De La O at [facility] for advice to decrease chances of evolution from DM2 to DM1. He reports that Dr. De La O is not in his network. I will check with [facility] faculty for their advice:\par            [email or web address]            [phone]\par            .\par            Remains on:\par            Actos 30 mg in PM\par            glipizide ER 5 mg in PM\par            Onglyza 5 mg daily\par            Metformin 1000 mg BID.\par            .\par            Impression: Doing well. Last labs in May 6.1%\par            .\par            Plan: Same Rx. ROV in 4 months after updated labs. Thank you.\par            .\par            ===\par            .\par            May 27, 2014\par            PCP: Dr. Wilson\par            CC: DM2 (2001);\par            .\par            Last note appended below.\par            On metformin 1000 mg BID\par            Onglyza 5 mg daily\par            glipizide XR 5 mg\par            Actos 30 mg in PM.\par            .\par            Fingerstick BS in good range. FBS about 110 mg/dl average.\par            Sees [facility]mology at [facility]. \par            .\par            Impression: His daughter has Type 1 Diabetes. His recent blood tests show post breakfast BS of 131 and C-peptide of 5.07 (0.8-3.1) at [facility] suggesting he currently makes considerable insulin and is insulin resistant. 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 For that reason, I suggested to him that he may benefit from evaluation at [facility] (Dr. Vegas fax [phone]) for possible study or strategy to decrease progression to DM1 and he will ask you advice for a referral in that regard. The Anti parietal cell ab is negative. B12 is borderline. \par             Thank you. -\par            .\par            =========\par            Dec 16, 2013\par            PCP: Dr. Bhardwaj/Dr. Faulkner\par            CC: DM2, since 2001, low B12\par            .\par            15 yo daughter has DM1 diagnosed age 9 [address] Erickson. \par            He stopped Actos prescriibedd by Dr. Silva b/o concern about bladder cancer. Dr. Silva had him on 30 mg and I restarted at 15 mg, but he wants the 30 mg Actos now that he is back on it.\par            .\par            I switched him to metformin XR 1000 mg BID, but he prefers the regular metformin as it is smaller and less expensive.\par            .\par            Because the FBS (and A1c) have drifted up a bit, I have advised he increase the PM glipizide 2.5 mg XR to 5 mg XR.\par            .\par            He will remain on 5 mg Onglyza every AM.\par            .\par            B12 is only 202. \par            Imp: Lower B12 may be a metformin effect. \par            .\par            Plan: As above.\par            .\par            ====\par            August 19, 2013\par            PCP: Dr. Ken Wilson/Dr. Faulkner\par            CC: DM2\par            .\par            Recent A1c again 5.9%\par            B12 again low\par            .\par            On Onglyza 5 mg\par            Actos 15 in PM\par            Metformin 1000 mg BID\par            glipizide ER 2.5 mg in PM\par            .\par            Brought NO records. \par            Impression: BS in good range.\par            Plan: Same Rx. ROV 4 months.\par            Get B12 shot at [facility]. \par            .\par            ====\par            April 1, 2013 CC: Diabetes \par            Meds the same.\par            A1c is 5.9% B12 183.\par            Doing well. Highest BS is 200 after instant oatmeal.\par            Eyes checked recently at [facility].\par            Feels well.\par            He will hand deliver labs to Dr. Wilson\par            .\par            .\par            ++++++\par            Visits for DM2. Meds the same. Recent A1c 6.1%. Had recent eye exam at [facility]. Feels well. Previous note appended below:\par            .s. \par            .\par            "July 25, 2012 Re: Michael Moore [birth year]\par            Attn: Dr. Ken Wilson\par            Mr. Moore is 42 yo, father of a 21 and 15 yo, works at the [employer] in  and he returns regarding DM diagnosed in 2001. He is currently taking:\par            metformin 1000 mg BID\par            Onglyza 5 mg qAM\par            Actos 15 mg qPM \par            glipizide 2.5 mg qPM \par            Recent HbA1c is 6.1 (down from 8.0 in August but up from 5.8% last visit). However he did not bring in his records today. \par            No foot discomfort; eye exam a few month ago at [facility]. \par            Weight today is 275 lbs fully clothed, 6 ft tall.\par            Impression: Diabetes appears to be under excellent control\par            Plan: Same Rx. \par            Sincerely,\par            James Hellerman MD\par            Endocrinology [address] [place] [address]".

## 2021-01-07 ENCOUNTER — APPOINTMENT (OUTPATIENT)
Dept: ENDOCRINOLOGY | Facility: CLINIC | Age: 51
End: 2021-01-07

## 2021-02-12 ENCOUNTER — APPOINTMENT (OUTPATIENT)
Dept: ENDOCRINOLOGY | Facility: CLINIC | Age: 51
End: 2021-02-12
Payer: COMMERCIAL

## 2021-02-12 PROCEDURE — 99214 OFFICE O/P EST MOD 30 MIN: CPT | Mod: 95

## 2021-02-12 NOTE — HISTORY OF PRESENT ILLNESS
[Home] : at home, [unfilled] , at the time of the visit. [Medical Office: ([facility])___] : at the medical office located in  [Verbal consent obtained from patient] : the patient, [unfilled] [FreeTextEntry1] : Feb 12, 2021    VideoChat  \par \par PCP: Dr. Brandon Faulkner c/o [facility] [place]\par             Card: Dr. Pernell Kirkpatrick c/o [facility] \par             Eyes: Dr. Kylah Gonzales\par \par            CC: "Type 2 Diabetes" (2001) ?NALLELY   Reactive hypoglycemia\par             +Anti-HARRY 65 ab & daughter DM1\par             Low vitamin B12 (neg intrinsic factor and parietal cell ab 2014)\par             Low Vitamin D\par             (Low WBC)\par \par  metformin  1000 mg BID \par pioglitazone 15 mg in PM (lowered from 30)\par Januavia 100 mg  - needs refill via [facility] \par glipizide ER 10 mg in PM\par \par \par \par \par Apr 30, 2020  VideoChat [employer]   android  [phone]  \par \par PCP: Dr. Brandon Faulkner c/o [facility] [place]\par             Card: Dr. Pernell Kirkpatrick c/o [facility] \par             Eyes: Dr. Kylah Gonzales\par \par            CC: "Type 2 Diabetes" (2001) ?NALLELY   Reactive hypoglycemia\par             +Anti-HARRY 65 ab & daughter DM1\par             Low vitamin B12 (neg intrinsic factor and parietal cell ab 2014)\par             Low Vitamin D\par             (Low WBC)\par \par Working from home \par Recent [facility]  by Dr. Faulkner included.\par 2/25/2020 included\par glucose 108 mg/dl  - fasting \par creat 0.99\par ca 9.4\par TP 6.8\par alb 4.4\par low alk phos ***\par WBC 4.1 \par Hct 35.9  ****\par MCV  87.6 \par \par A1c  6.3 ***\par HDL   46\par tri   89\par LDL  65 on atrovastatin\par \par urine micro   9   - ratio\par iron - nl \par ferritin 89 \par \par TSH 1.25\par PSA  0.5 \par \par For diabetes, he is taking:\par \par  metformin  1000 mg BID \par pioglitazone 15 mg in PM (lowered from 30)\par Januavia 100 mg  - needs refill via [facility] \par glipizide ER 10 mg in PM\par \par \par Oct 17, 2019\par PCP: Dr. Brandon Faulkner c/o [facility] [place]\par             Card: Dr. Pernell orta/o [facility] \par             Eyes: Dr. Kylah Gonzales\par \par            CC: "Type 2 Diabetes" (2001) ?NALLELY   Reactive hypoglycemia\par             +Anti-HARRY 65 ab & daughter DM1\par             Low vitamin B12 (neg intrinsic factor and parietal cell ab 2014)\par             Low Vitamin D\par             (Low WBC)\par \par Feels well.\par Recent CBC at [facility] shows Hct 36and at [place] in July 2015 Hct was 46.8.\par He will discuss with Dr. Faulkner at upcoming visit.\par ROV here for diabetes in February 2020.   \par \par \par \par April 26, 2019\par \par     PCP: Dr. Brandon Faulkner c/o [facility] [place]\par             Card: Dr. Pernell orta/mary ellen MEJIAs \par             Eyes: Dr. Kylah Gonzales\par \par            CC: "Type 2 Diabetes" (2001) ?NALLELY   Reactive hypoglycemia\par             +Anti-HARRY 65 ab & daughter DM1\par             Low vitamin B12 (neg intrinsic factor and parietal cell ab 2014)\par             Low Vitamin D\par             (Low WBC)\par \par Gets reactive hypoglyemia\par \par Needs refill on metformin  1000 mg BID via [facility]\par OK with\par pioglitazone 15 mg in PM (lowered from 30)\par Januavia 100 mg\par glipizid ER 10 mg in PM\par \par Recent [facility] labs from\par 4/11/2019\par A1c 5.8 %\par  mg/dl \par \par \par Previous notes from [employer] appended below.\par \par  October 4, 2018\par            .\par            PCP: Dr. Brandon Faulkner c/o [facility] [place]\par             Card: Dr. Pernell orta/mary ellen [facility] \par             Eyes: Dr. Kylah Gonzales\par            CC: "Type 2 Diabetes" (2001) ?NALLELY\par             +Anti-HARRY 65 ab & daughter DM1\par             Low vitamin B12 (neg intrinsic factor and parietal cell ab 2014)\par             Low Vitamin D\par             (Low WBC)\par            .\par            His valsartan was recalled.\par            .\par            Recent [facility] A1c 6.4 % (up from 6.1)\par            urine microalbumin \par            .\par            Actos dose being decreased from 30 to 15 mg in PM\par            Januvia will stay at 100 mg daily. \par            glipizide ER 10 mg in PM\par            metformin 1000 mg BID \par            .\par            .\par            Impression: Diabetes doing very well. \par            .\par            Plan: Same Rx.\par            .Annual eye exam\par            .\par            ==\par            .\par            May 17, 2018\par            .\par            PCP: Dr. Brandon Faulkner c/o [facility] [place]\par             Card: Dr. Pernell Kirkpatrick c/o [facility] \par             Eyes: Dr. Matt rico [facility]\par            CC: "Type 2 Diabetes" (2001) ?NALLELY\par             +Anti-HARRY 65 ab & daughter DM1\par             Low vitamin B12 (neg intrinsic factor and parietal cell ab 2014)\par             Low Vitamin D\par             (Low WBC)\par            .\par            INTERNET DOWN.\par            .\par            Doing well.\par            .\par            his phone is \par            Rx / Refills Continue Metformin HCl 1000MG, , 1 tablet twice a day \par             Continue GlipiZIDE XL 10 mg, , 1 tablet Once a day in PM \par             Continue Januvia 100 MG, , 1 tablet Once a day \par             Continue Actos 30 MG, , 1 tablet once a day in PM \par            .\par            Dec 14, 2017\par            .\par            PCP: Dr. Brandon Faulkner c/o [facility] [place]\par             Card: Dr. Pernell Kirkpatrick c/[facility] \par             Eyes: Dr. Matt rico [facility]\par            CC: "Type 2 Diabetes" (2001) ?NALLELY\par             +Anti-HARRY 65 ab & daughter DM1\par             Low vitamin B12 (neg intrinsic factor and parietal cell ab 2014)\par             Low Vitamin D\par             (Low WBC)\par            .\par            Still takiing:.\par            .\par            Actos 30 mg in PM \par            glipizide ER 10 mg in PM\par            Onglyza 5 mg daily-> Januvia 100 mg daily\par            Metformin 1000 mg BID\par            .\par            Labs Nov 30 included\par            microal ratio 5\par            glucose 131 fasting \par            creatinine 1.01 **\par            HbA1c 6.1 %\par            .\par            Impression: Since Actos decreased from 30 to 15 mg the FBS drifted up a bit. Has been too busy to test. \par            .\par            Plan: Discussed [facility].\par            Eye exam.\par            ROV 4 months. \par            .,\par            .\par            ==\par            .\par            August 10, 2017\par            .\par            PCP: Dr. Brandon Faulkner c/o [facility]\par             Card: Dr. Pernell Kirkpatrick c/o [facility]\par            CC: "DM2" (2001) ?NALLELY\par             +Anti-HARRY 65 ab & daughter DM1\par             Low vitamin B12 (neg intrinsic factor and parietal cell ab 2014)\par             Low Vitamin D\par             (Low WBC)\par            .\par            .\par            Actos 30 mg in PM \par            glipizide ER 10 mg in PM\par            Onglyza 5 mg daily-> Januvia 100 mg daily\par            Metformin 1000 mg BID\par            .,\par            Takes B12, Vit D OTC daily as well. \par            .\par            Goal will be to taper off of Actos. \par            .\par            Most recent labs ([facility]) from\par            7/27/2017\par            [id number] mg/dl\par            gastrin 31\par            gliadin ab neg\par            HbA1c 5.9 % *** (as high as 8.0 in August 2011) \par            .\par            Brings in fingerstick BS.\par            FBS .\par            Some LOW ac lunc 49, 56\par            .\par            Impression: Low AC lunch may be related to reactive to \par            oatmeal or delayed effect of PM glipizide ER.\par            .\par            Goal will be to taper off of Actos.\par            .\par            Plan: Increse exercise.\par            Decrease Actos to 15 mg.\par            ROV 3 months after labs.\par            .\par            ==\par            .\par            March 9, 2017\par            .\par            PCP: Dr. Ken Wilson c/o [facility]\par             Card: Dr. Pernell Kirkpatrick c/[facility]\par            CC: "DM2" (2001) ?NALLELY\par             +Anti-HARRY 65 ab & daughter DM1\par             Low vitamin B12 (neg intrinsic factor and parietal cell ab 2014)\par             (Low WBC)\par            .\par            Medications currently include:\par            .\par            Actos 30 mg in PM\par            glipizide ER 10 mg in PM\par            Onglyza 5 mg daily-\par            Metformin 1000 mg BID\par            .\par            Also taking metoprolol 25 mg in PM\par            .\par            Impression: Doing well.\par            Fingerstick BS in good range.\par            Hopefully this will be verified by A1c\par            .\par            Plan: Annual eye exam\par            .\par            ==\par            .\par            Nov 10, 2016\par            .\par            PCP: Dr. Ken Wilson c/o [facility]\par            CC: "DM2" (2001) ?NALLELY\par             +Anti-HARRY 65 ab & daughter DM1\par             Low vitamin B12 (neg intrinsic factor and parietal cell ab 2014)\par             (Low WBC)\par            .\par            Had panic attacks\par            Told of low K\par            Got too low with 2 glip in PM \par            .\par            Needs Onglyza. \par            On vit D and B12\par            .\par            Impression: Stable.\par            .\par            Plan: Annual eye exam\par            Continue:\par            Actos 30 mg in PM\par            glipizide ER 10 mg in PM\par            Onglyza 5 mg daily\par            Metformin 1000 mg BID\par            .\par            ==\par            .\par            July 21, 2016\par            .\par            PCP: Dr. Ken Wilson c/[facility]\par            CC: "DM2" (2001) ?NALLELY\par             +Anti-HARRY 65 ab & daughter DM1\par             Low vitamin B12 (neg intrinsic factor and parietal cell ab 2014)\par             (Low WBC)\par            .\par            Last visit advised him to increase PM glipizide ER 10 mg to two pills in AM, but he did not. \par            Did not bring records to this visit. (but he reports sugars in good range on current Rx.\par            .\par            Impression: Recent A1c of 6.2% suggests good control.\par            .\par            Plan: Continue glipizide ER 10 mg at one pill in PM along with the other medications for diabetes. \par            Updated A1c before he returns in3-4 mons. \par            .\par            ==\par            .\par            March 10, 2016\par            .\par            PCP: Dr. Ken Wilson c/o [facility]\par            CC: "DM2" (2001)\par             +Anti-HARRY 65 ab & daughter DM1\par             Low vitamin B12 (neg intrinsic factor and parietal cell ab 2014)\par             (Low WBC)\par            3/3/2016 [facility] labs included\par            .\par             mg/dl\par            Fructosamine 327 (190-270)\par            .\par            HbA1c 7.3 %\par            .\par            Actos 30 mg in PM\par            glipizide ER 10 mg in PM\par            Onglyza 5 mg daily\par            Metformin 1000 mg BID\par            .\par            .\par            Plan: Next labs, check B12, Vit D, WBC, A1c\par            Because of elevated FBS can increase glipizide ER 10 mg to\par            two pills in PM. \par            .\par            "I feel fine, lost some weight...." -Lost 15 lbs since April. \par            .\par            .\par            ==\par            .\par            November 12, 2015\par            .\par            PCP: Dr. Ken orta/mary [facility]\par            CC: "DM2" (2001)\par             +Anti-HARRY 65 ab & daughter DM1\par             Low vitamin B12 (neg intrinsic factor and parietal cell ab 2014)\par             (Low WBC)\par            .\par            Labs July 2 included A1c 7.2 %\par            B12 155 (180-9000)\par            WBC 2.4 \par            .\par            Actos 30 mg in PM\par            glipizide ER 5 mg in PM\par            Onglyza 5 mg daily\par            Metformin 1000 mg BID\par            .\par            Impression: 17 yo daughter keeping him busy. She has insulin pump.\par            He has not been testing his own sugars.\par            .\par            Plan: Updated A1c. -jh\par            .\par            .\par            ==\par            .\par            July 2, 2015\par            .\par            .\par            PCP: Dr. 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 Not clear if rise in the FBS is a sign of this, but will increase PM glipizide to 10 mg and ROV in 4 months.\par            .\par            .===\par            Nov 17, 2014\par            PCP: Dr. 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 \par            .\par            Because of +HARRY 65 and daughter's DM1, I encouraged him to\par            see Dr. Imtiaz De La O at [facility] for advice to decrease chances of evolution from DM2 to DM1. He reports that Dr. De La O is not in his network. I will check with [facility] faculty for their advice:\par            [email or web address]\par            [phone]\par            .\par            Remains on:\par            Actos 30 mg in PM\par            glipizide ER 5 mg in PM\par            Onglyza 5 mg daily\par            Metformin 1000 mg BID.\par            .\par            Impression: Doing well. Last labs in May 6.1%\par            .\par            Plan: Same Rx. ROV in 4 months after updated labs. Thank you.\par            .\par            ===\par            .\par            May 27, 2014\par            PCP: Dr. Wilson\par            CC: DM2 (2001);\par            .\par            Last note appended below.\par            On metformin 1000 mg BID\par            Onglyza 5 mg daily\par            glipizide XR 5 mg\par            Actos 30 mg in PM.\par            .\par            Fingerstick BS in good range. FBS about 110 mg/dl average.\par            Sees Ophthalmology at [facility]. \par            .\par            Impression: His daughter has Type 1 Diabetes. His recent blood tests show post breakfast BS of 131 and C-peptide of 5.07 (0.8-3.1) at [facility] suggesting he currently makes considerable insulin and is insulin resistant. 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 For that reason, I suggested to him that he may benefit from evaluation at [facility] (Dr. Vegas fax [phone]) for possible study or strategy to decrease progression to DM1 and he will ask you advice for a referral in that regard. The Anti parietal cell ab is negative. B12 is borderline. \par             Thank you. -jh\par            .\par            =========\par            Dec 16, 2013\par            PCP: Dr. Bhardwaj/Dr. Faulkner\par            CC: DM2, since 2001, low B12\par            .\par            13 yo daughter has DM1 diagnosed age 9 [address] Erickson. \par            He stopped Actos prescriibedd by Dr. Silva b/o concern about bladder cancer. Dr. Silva had him on 30 mg and I restarted at 15 mg, but he wants the 30 mg Actos now that he is back on it.\par            .\par            I switched him to metformin XR 1000 mg BID, but he prefers the regular metformin as it is smaller and less expensive.\par            .\par            Because the FBS (and A1c) have drifted up a bit, I have advised he increase the PM glipizide 2.5 mg XR to 5 mg XR.\par            .\par            He will remain on 5 mg Onglyza every AM.\par            .\par            B12 is only 202. \par            Imp: Lower B12 may be a metformin effect. \par            .\par            Plan: As above.\par            .\par            ====\par            August 19, 2013\par            PCP: Dr. Ken Wilson/Dr. Faulkner\par            CC: DM2\par            .\par            Recent A1c again 5.9%\par            B12 again low\par            .\par            On Onglyza 5 mg\par            Actos 15 in PM\par            Metformin 1000 mg BID\par            glipizide ER 2.5 mg in PM\par            .\par            Brought NO records. \par            Impression: BS in good range.\par            Plan: Same Rx. ROV 4 months.\par            Get B12 shot at [facility]. \par            .\par            ====\par            April 1, 2013 CC: Diabetes \par            Meds the same.\par            A1c is 5.9% B12 183.\par            Doing well. Highest BS is 200 after instant oatmeal.\par            Eyes checked recently at [facility].\par            Feels well.\par            He will hand deliver labs to Dr. Wilson\par            .\par            .\par            ++++++\par            Visits for DM2. Meds the same. Recent A1c 6.1%. Had recent eye exam at [facility]. Feels well. Previous note appended below:\par            .s. \par            .\par            "July 25, 2012 Re: Michael Moore [birth year]\par            Attn: Dr. Ken Wilson\par            Mr. Moore is 40 yo, father of a 21 and 15 yo, works at the [employer] in  and he returns regarding DM diagnosed in 2001. He is currently taking:\par            metformin 1000 mg BID\par            Onglyza 5 mg qAM\par            Actos 15 mg qPM \par            glipizide 2.5 mg qPM \par            Recent HbA1c is 6.1 (down from 8.0 in August but up from 5.8% last visit). However he did not bring in his records today. \par            No foot discomfort; eye exam a few month ago at [facility]. \par            Weight today is 275 lbs fully clothed, 6 ft tall.\par            Impression: Diabetes appears to be under excellent control\par            Plan: Same Rx. \par            Sincerely,\par            James Hellerman MD\par            Endocrinology [address] [place] [id number]".

## 2021-02-12 NOTE — ASSESSMENT
[FreeTextEntry1] : Diabetes under good control.\par Occasional mild hypoglycemia, sometimes may be reactive (e.g., after oatmeal)

## 2021-03-25 ENCOUNTER — APPOINTMENT (OUTPATIENT)
Dept: ENDOCRINOLOGY | Facility: CLINIC | Age: 51
End: 2021-03-25
Payer: COMMERCIAL

## 2021-03-25 PROCEDURE — 99214 OFFICE O/P EST MOD 30 MIN: CPT | Mod: 95

## 2021-03-25 NOTE — HISTORY OF PRESENT ILLNESS
[FreeTextEntry1] : Mar 25, 2021     Videochat\par \par PCP: Dr. Brandon Faulkner c/o [facility]    Feb 2020  and plans to see soon \par             Card: Dr. Pernell Kirkpatrick c/o [facility] \par             Eyes: Dr. Kylah Gonzales   - Sept 2020 \par \par            CC: "Type 2 Diabetes" (2001) ?NALLELY   Reactive hypoglycemia**\par             +Anti-HARRY 65 ab & daughter DM1\par             Low vitamin B12 (neg intrinsic factor and parietal cell ab 2014)\par             Low Vitamin D\par             (Low WBC)\par \par  metformin  1000 mg BID \par pioglitazone 15 mg in PM (lowered from 30)\par Januavia 100 mg  - needs refill via [facility] \par glipizide ER 10 mg in PM\par \par Reports fingerstick sugars are doing well.  \par 3/16/2021  [facility]\par \par LDL 79 on atorvastatin\par glucose 88 mg/dl\par A1c 5.3 %     highest  after cereal \par Hct 33.9  **   (had been ~37 in 2013)\par \par 25 OH vit D 39 \par \par \par Impression:  BS in excellent range.\par Reason for low Hct not apparent.\par May be stuttering slowly down.\par Will ask him to see Hematology regarcing the anemia.\par Updated labs before next visit\par \par \par \par \par Feb 12, 2021    VideoChat  \par \par PCP: Dr. Brandon Faulkner c/o [facility]\par             Card: Dr. Pernell Kirkpatrick c/o [facility] \par             Eyes: Dr. Kylah Gonzales\par \par            CC: "Type 2 Diabetes" (2001) ?NALLELY   Reactive hypoglycemia\par             +Anti-HARRY 65 ab & daughter DM1\par             Low vitamin B12 (neg intrinsic factor and parietal cell ab 2014)\par             Low Vitamin D\par             (Low WBC)\par \par  metformin  1000 mg BID \par pioglitazone 15 mg in PM (lowered from 30)\par Januavia 100 mg  - needs refill via [facility] \par glipizide ER 10 mg in PM\par \par \par \par \par Apr 30, 2020  VideoChat [employer]   android    \par \par PCP: Dr. Brandon Faulkner c/o [facility]\par             Card: Dr. Pernell Kirkpatrick [facility]/o [facility] \par             Eyes: Dr. Kylah Gonzales\par \par            CC: "Type 2 Diabetes" (2001) ?NALLELY   Reactive hypoglycemia\par             +Anti-HARRY 65 ab & daughter DM1\par             Low vitamin B12 (neg intrinsic factor and parietal cell ab 2014)\par             Low Vitamin D\par             (Low WBC)\par \par Working from home \par Recent [facility]  by Dr. Faulkner included.\par 2/25/2020 included\par glucose 108 mg/dl  - fasting \par creat 0.99\par ca 9.4\par TP 6.8\par alb 4.4\par low alk phos ***\par WBC 4.1 \par Hct 35.9  ****\par MCV  87.6 \par \par A1c  6.3 ***\par HDL   46\par tri   89\par LDL  65 on atrovastatin\par \par urine micro   9   - ratio\par iron - nl \par ferritin 89 \par \par TSH 1.25\par PSA  0.5 \par \par For diabetes, he is taking:\par \par  metformin  1000 mg BID \par pioglitazone 15 mg in PM (lowered from 30)\par Januavia 100 mg  - needs refill via [facility] \par glipizide ER 10 mg in PM\par \par \par Oct 17, 2019\par PCP: Dr. Brandon Faulkner c/o [facility]\par             Card: Dr. Pernell Kirkpatrick [facility]/o [facility] \par             Eyes: Dr. Kylah Gonzales\par \par            CC: "Type 2 Diabetes" (2001) ?NALLELY   Reactive hypoglycemia\par             +Anti-HARRY 65 ab & daughter DM1\par             Low vitamin B12 (neg intrinsic factor and parietal cell ab 2014)\par             Low Vitamin D\par             (Low WBC)\par \par Feels well.\par Recent CBC at [facility] shows Hct 36and at [place] in July 2015 Hct was 46.8.\par He will discuss with Dr. Faulkner at upcoming visit.\par ROV here for diabetes in February 2020.   \par \par \par \par April 26, 2019\par \par     PCP: Dr. Brandon Faulkner c/o [facility]\par             Card: Dr. Pernell orta/o [facility] \par             Eyes: Dr. Kylah Gonzales\par \par            CC: "Type 2 Diabetes" (2001) ?NALLELY   Reactive hypoglycemia\par             +Anti-HARRY 65 ab & daughter DM1\par             Low vitamin B12 (neg intrinsic factor and parietal cell ab 2014)\par             Low Vitamin D\par             (Low WBC)\par \par Gets reactive hypoglyemia\par \par Needs refill on metformin  1000 mg BID via [facility]\par OK with\par pioglitazone 15 mg in PM (lowered from 30)\par Januavia 100 mg\par glipizid ER 10 mg in PM\par \par Recent [facility] labs from\par 4/11/2019\par A1c 5.8 %\par  mg/dl \par \par \par Previous notes from [employer] appended below.\par \par  October 4, 2018\par            .\par            PCP: Dr. Brandon Faulkner c/o [facility]\par             Card: Dr. Pernell orta/o [facility] \par             Eyes: Dr. Kylah Gonzales\par            CC: "Type 2 Diabetes" (2001) ?NALLELY\par             +Anti-HARRY 65 ab & daughter DM1\par             Low vitamin B12 (neg intrinsic factor and parietal cell ab 2014)\par             Low Vitamin D\par             (Low WBC)\par            .\par            His valsartan was recalled.\par            .\par            Recent [facility] A1c 6.4 % (up from 6.1)\par            urine microalbumin \par            .\par            Actos dose being decreased from 30 to 15 mg in PM\par            Januvia will stay at 100 mg daily. \par            glipizide ER 10 mg in PM\par            metformin 1000 mg BID \par            .\par            .\par            Impression: Diabetes doing very well. \par            .\par            Plan: Same Rx.\par            .Annual eye exam\par            .\par            ==\par            .\par            May 17, 2018\par            .\par            PCP: Dr. Brandon Faulkner c/o [facility]\par             Card: Dr. Pernell orta/o [facility] \par             Eyes: Dr. Matt rico [facility]\par            CC: "Type 2 Diabetes" (2001) ?NALLELY\par             +Anti-HARRY 65 ab & daughter DM1\par             Low vitamin B12 (neg intrinsic factor and parietal cell ab 2014)\par             Low Vitamin D\par             (Low WBC)\par            .\par            INTERNET DOWN.\par            .\par            Doing well.\par            .\par            his phone is \par            Rx / Refills Continue Metformin HCl 1000MG, , 1 tablet twice a day \par             Continue GlipiZIDE XL 10 mg, , 1 tablet Once a day in PM \par             Continue Januvia 100 MG, , 1 tablet Once a day \par             Continue Actos 30 MG, , 1 tablet once a day in PM \par            .\par            Dec 14, 2017\par            .\par            PCP: Dr. Brandon Faulkner c/o [facility]\par             Card: Dr. Pernell Kirkpatrick [facility]/o [facility] \par             Eyes: Dr. Matt rico [facility]\par            CC: "Type 2 Diabetes" (2001) ?NALLELY\par             +Anti-HARRY 65 ab & daughter DM1\par             Low vitamin B12 (neg intrinsic factor and parietal cell ab 2014)\par             Low Vitamin D\par             (Low WBC)\par            .\par            Still takiing:.\par            .\par            Actos 30 mg in PM \par            glipizide ER 10 mg in PM\par            Onglyza 5 mg daily-> Januvia 100 mg daily\par            Metformin 1000 mg BID\par            .\par            Labs Nov 30 included\par            microal ratio 5\par            glucose 131 fasting \par            creatinine 1.01 **\par            HbA1c 6.1 %\par            .\par            Impression: Since Actos decreased from 30 to 15 mg the FBS drifted up a bit. Has been too busy to test. \par            .\par            Plan: Discussed [facility].\par            Eye exam.\par            ROV 4 months. \par            .,\par            .\par            ==\par            .\par            August 10, 2017\par            .\par            PCP: Dr. Brandon Faulkner [facility]\par             Card: Dr. Pernell Kirkpatrick [facility]\par            CC: "DM2" (2001) ?NALLELY\par             +Anti-HARRY 65 ab & daughter DM1\par             Low vitamin B12 (neg intrinsic factor and parietal cell ab 2014)\par             Low Vitamin D\par             (Low WBC)\par            .\par            .\par            Actos 30 mg in PM \par            glipizide ER 10 mg in PM\par            Onglyza 5 mg daily-> Januvia 100 mg daily\par            Metformin 1000 mg BID\par            .,\par            Takes B12, Vit D OTC daily as well. \par            .\par            Goal will be to taper off of Actos. \par            .\par            Most recent labs ([facility]) from\par            7/27/2017\par            [id number] mg/dl\par            gastrin 31\par            gliadin ab neg\par            HbA1c 5.9 % *** (as high as 8.0 in August 2011) \par            .\par            Brings in fingerstick BS.\par            FBS .\par            Some LOW ac lunc 49, 56\par            .\par            Impression: Low AC lunch may be related to reactive to \par            oatmeal or delayed effect of PM glipizide ER.\par            .\par            Goal will be to taper off of Actos.\par            .\par            Plan: Increse exercise.\par            Decrease Actos to 15 mg.\par            ROV 3 months after labs.\par            .\par            ==\par            .\par            March 9, 2017\par            .\par            PCP: Dr. Ken Wilson [facility]/o [facility]\par             Card: Dr. Pernell Kirkpatrick [facility]\par            CC: "DM2" (2001) ?NALLELY\par             +Anti-HARRY 65 ab & daughter DM1\par             Low vitamin B12 (neg intrinsic factor and parietal cell ab 2014)\par             (Low WBC)\par            .\par            Medications currently include:\par            .\par            Actos 30 mg in PM\par            glipizide ER 10 mg in PM\par            Onglyza 5 mg daily-\par            Metformin 1000 mg BID\par            .\par            Also taking metoprolol 25 mg in PM\par            .\par            Impression: Doing well.\par            Fingerstick BS in good range.\par            Hopefully this will be verified by A1c\par            .\par            Plan: Annual eye exam\par            .\par            ==\par            .\par            Nov 10, 2016\par            .\par            PCP: Dr. Ken Wilson [facility]\par            CC: "DM2" (2001) ?NALLELY\par             +Anti-HARRY 65 ab & daughter DM1\par             Low vitamin B12 (neg intrinsic factor and parietal cell ab 2014)\par             (Low WBC)\par            .\par            Had panic attacks\par            Told of low K\par            Got too low with 2 glip in PM \par            .\par            Needs Onglyza. \par            On vit D and B12\par            .\par            Impression: Stable.\par            .\par            Plan: Annual eye exam\par            Continue:\par            Actos 30 mg in PM\par            glipizide ER 10 mg in PM\par            Onglyza 5 mg daily\par            Metformin 1000 mg BID\par            .\par            ==\par            .\par            July 21, 2016\par            .\par            PCP: Dr. Ken Wilson [facility]\par            CC: "DM2" (2001) ?NALLELY\par             +Anti-HARRY 65 ab & daughter DM1\par             Low vitamin B12 (neg intrinsic factor and parietal cell ab 2014)\par             (Low WBC)\par            .\par            Last visit advised him to increase PM glipizide ER 10 mg to two pills in AM, but he did not. \par            Did not bring records to this visit. (but he reports sugars in good range on current Rx.\par            .\par            Impression: Recent A1c of 6.2% suggests good control.\par            .\par            Plan: Continue glipizide ER 10 mg at one pill in PM along with the other medications for diabetes. \par            Updated A1c before he returns in3-4 mons. \par            .\par            ==\par            .\par            March 10, 2016\par            .\par            PCP: Dr. Ken Wilson [facility]\par            CC: "DM2" (2001)\par             +Anti-HARRY 65 ab & daughter DM1\par             Low vitamin B12 (neg intrinsic factor and parietal cell ab 2014)\par             (Low WBC)\par            3/3/2016 [facility] labs included\par            .\par             mg/dl\par            Fructosamine 327 (190-270)\par            .\par            HbA1c 7.3 %\par            .\par            Actos 30 mg in PM\par            glipizide ER 10 mg in PM\par            Onglyza 5 mg daily\par            Metformin 1000 mg BID\par            .\par            .\par            Plan: Next labs, check B12, Vit D, WBC, A1c\par            Because of elevated FBS can increase glipizide ER 10 mg to\par            two pills in PM. \par            .\par            "I feel fine, lost some weight...." -Lost 15 lbs since April. \par            .\par            .\par            ==\par            .\par            November 12, 2015\par            .\par            PCP: Dr. Ken Wilson [facility]\par            CC: "DM2" (2001)\par             +Anti-HARRY 65 ab & daughter DM1\par             Low vitamin B12 (neg intrinsic factor and parietal cell ab 2014)\par             (Low WBC)\par            .\par            Labs July 2 included A1c 7.2 %\par            B12 155 (180-9000)\par            WBC 2.4 \par            .\par            Actos 30 mg in PM\par            glipizide ER 5 mg in PM\par            Onglyza 5 mg daily\par            Metformin 1000 mg BID\par            .\par            Impression: 17 yo daughter keeping him busy. She has insulin pump.\par            He has not been testing his own sugars.\par            .\par            Plan: Updated A1c. -jh\par            .\par            .\par            ==\par            .\par            July 2, 2015\par            .\par            .\par            PCP: Dr. 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 Not clear if rise in the FBS is a sign of this, but will increase PM glipizide to 10 mg and ROV in 4 months.\par            .\par            .===\par            Nov 17, 2014\par            PCP: Dr. 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 \par            .\par            Because of +HARRY 65 and daughter's DM1, I encouraged him to\par            see Dr. Imtiaz De La O at [facility] for advice to decrease chances of evolution from DM2 to DM1. He reports that Dr. De La O is not in his network. I will check with [facility] faculty for their advice:\par            [email or web address]\par            [phone]\par            .\par            Remains on:\par            Actos 30 mg in PM\par            glipizide ER 5 mg in PM\par            Onglyza 5 mg daily\par            Metformin 1000 mg BID.\par            .\par            Impression: Doing well. Last labs in May 6.1%\par            .\par            Plan: Same Rx. ROV in 4 months after updated labs. Thank you.\par            .\par            ===\par            .\par            May 27, 2014\par            PCP: Dr. Wilson\par            CC: DM2 (2001);\par            .\par            Last note appended below.\par            On metformin 1000 mg BID\par            Onglyza 5 mg daily\par            glipizide XR 5 mg\par            Actos 30 mg in PM.\par            .\par            Fingerstick BS in good range. FBS about 110 mg/dl average.\par            Sees Ophthalmology at [facility]. \par            .\par            Impression: His daughter has Type 1 Diabetes. His recent blood tests show post breakfast BS of 131 and C-peptide of 5.07 (0.8-3.1) at [facility] suggesting he currently makes considerable insulin and is insulin resistant. 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 For that reason, I suggested to him that he may benefit from evaluation at [facility] (Dr. Vegas fax [phone]) for possible study or strategy to decrease progression to DM1 and he will ask you advice for a referral in that regard. The Anti parietal cell ab is negative. B12 is borderline. \par             Thank you. -jh\par            .\par            =========\par            Dec 16, 2013\par            PCP: Dr. Bhardwaj/Dr. Faulkner\par            CC: DM2, since 2001, low B12\par            .\par            15 yo daughter has DM1 diagnosed age 9 [address] Erickson. \par            He stopped Actos prescriibedd by Dr. Silva b/o concern about bladder cancer. Dr. Silva had him on 30 mg and I restarted at 15 mg, but he wants the 30 mg Actos now that he is back on it.\par            .\par            I switched him to metformin XR 1000 mg BID, but he prefers the regular metformin as it is smaller and less expensive.\par            .\par            Because the FBS (and A1c) have drifted up a bit, I have advised he increase the PM glipizide 2.5 mg XR to 5 mg XR.\par            .\par            He will remain on 5 mg Onglyza every AM.\par            .\par            B12 is only 202. \par            Imp: Lower B12 may be a metformin effect. \par            .\par            Plan: As above.\par            .\par            ====\par            August 19, 2013\par            PCP: Dr. Ken Wilson/Dr. Faulkner\par            CC: DM2\par            .\par            Recent A1c again 5.9%\par            B12 again low\par            .\par            On Onglyza 5 mg\par            Actos 15 in PM\par            Metformin 1000 mg BID\par            glipizide ER 2.5 mg in PM\par            .\par            Brought NO records. \par            Impression: BS in good range.\par            Plan: Same Rx. ROV 4 months.\par            Get B12 shot at [facility]. \par            .\par            ====\par            April 1, 2013 CC: Diabetes \par            Meds the same.\par            A1c is 5.9% B12 183.\par            Doing well. Highest BS is 200 after instant oatmeal.\par            Eyes checked recently at [facility].\par            Feels well.\par            He will hand deliver labs to Dr. Wilson\par            .\par            .\par            ++++++\par            Visits for DM2. Meds the same. Recent A1c 6.1%. Had recent eye exam at [facility]. Feels well. Previous note appended below:\par            .s. \par            .\par            "July 25, 2012 Re: Michael Moore [birth year]\par            Attn: Dr. Ken Wilson\par            Mr. Moore is 42 yo, father of a 21 and 15 yo, works at the [employer] in  and he returns regarding DM diagnosed in 2001. He is currently taking:\par            metformin 1000 mg BID\par            Onglyza 5 mg qAM\par            Actos 15 mg qPM \par            glipizide 2.5 mg qPM \par            Recent HbA1c is 6.1 (down from 8.0 in August but up from 5.8% last visit). However he did not bring in his records today. \par            No foot discomfort; eye exam a few month ago at [facility]. \par            Weight today is 275 lbs fully clothed, 6 ft tall.\par            Impression: Diabetes appears to be under excellent control\par            Plan: Same Rx. \par            Sincerely,\par            James Hellerman MD\par            Endocrinology [address] [place] [id number]".

## 2021-03-25 NOTE — ASSESSMENT
[FreeTextEntry1] : A1c and fingerstick sugars have been in excellent range.\par He may be able to take less medication.\par Annual eye exam.\par

## 2021-04-19 ENCOUNTER — RESULT REVIEW (OUTPATIENT)
Age: 51
End: 2021-04-19

## 2021-04-19 ENCOUNTER — APPOINTMENT (OUTPATIENT)
Dept: HEMATOLOGY ONCOLOGY | Facility: CLINIC | Age: 51
End: 2021-04-19
Payer: COMMERCIAL

## 2021-04-19 VITALS
HEART RATE: 85 BPM | RESPIRATION RATE: 16 BRPM | TEMPERATURE: 98.3 F | BODY MASS INDEX: 31.91 KG/M2 | DIASTOLIC BLOOD PRESSURE: 72 MMHG | SYSTOLIC BLOOD PRESSURE: 123 MMHG | OXYGEN SATURATION: 99 % | WEIGHT: 233 LBS | HEIGHT: 71.85 IN

## 2021-04-19 DIAGNOSIS — Z86.39 PERSONAL HISTORY OF OTHER ENDOCRINE, NUTRITIONAL AND METABOLIC DISEASE: ICD-10-CM

## 2021-04-19 DIAGNOSIS — Z87.898 PERSONAL HISTORY OF OTHER SPECIFIED CONDITIONS: ICD-10-CM

## 2021-04-19 PROCEDURE — 99072 ADDL SUPL MATRL&STAF TM PHE: CPT

## 2021-04-19 PROCEDURE — 99245 OFF/OP CONSLTJ NEW/EST HI 55: CPT

## 2021-04-19 RX ORDER — METOPROLOL TARTRATE 25 MG/1
25 TABLET, FILM COATED ORAL
Refills: 0 | Status: ACTIVE | COMMUNITY
Start: 2021-04-19

## 2021-04-19 RX ORDER — FAMOTIDINE 20 MG/1
20 TABLET, FILM COATED ORAL
Refills: 0 | Status: ACTIVE | COMMUNITY
Start: 2021-04-19

## 2021-04-19 RX ORDER — MELATONIN 3 MG
25 MCG TABLET ORAL
Refills: 0 | Status: ACTIVE | COMMUNITY
Start: 2021-04-19

## 2021-04-19 NOTE — HISTORY OF PRESENT ILLNESS
[de-identified] : Mr. Moore is a 50 year old man who presents for initial consultation of anemia.\par labs 3/2021: hgb 11.5, hct 33.9, ferritin 113, iron sat 28%; he reports he thinks he has been anemic for about 20 years, he has received vitamin B12 injections; he denies needing PO iron, IV iron, and blood transfusions.\par \par He reports feeling dizzy when going from bent to standing, but otherwise well.\par \par Had covid vaccine 2nd shot last monday. Currently feeling well\par \par Family History: mother not in life; 2 daughters alive and well\par denies history bleeding/clotting\par \par He currently works for human [employer] for the [employer]par \par Health Maintenance:\par Colonoscopy performed about 1 year ago Dr. Faulkner ([facility])

## 2021-04-19 NOTE — ASSESSMENT
[FreeTextEntry1] : #Anemia\par We have discussed the differential diagnosis of anemia.\par Will also rule out causes of anemia including nutritional deficiencies, thyroid dysfunction, hemolysis and hematologic disorders. Will check CBC with diff, CMP, LDH, Haptoglobin, Debo, B12, Folate, TSH, retic ct, Epo, PS, ESR/CRP, Immunoglobulins, FLACO, B12/Folate\par \par \par #DM 2\par A1C 5.3 - controlled\par follows with Dr. Hellerman\par On metformin, glipizide, actos and jenuvia\par \par RTC in 1 week to review findings

## 2021-04-19 NOTE — CONSULT LETTER
[Dear  ___] : Dear  [unfilled], [Consult Letter:] : I had the pleasure of evaluating your patient, [unfilled]. [Please see my note below.] : Please see my note below. [Consult Closing:] : Thank you very much for allowing me to participate in the care of this patient.  If you have any questions, please do not hesitate to contact me. [Sincerely,] : Sincerely, [FreeTextEntry3] : Kylah Stafford DO, FACCARMEN, FACP\par Medical Oncology and Hematology\par [facility] [place]\par

## 2021-04-27 ENCOUNTER — APPOINTMENT (OUTPATIENT)
Dept: HEMATOLOGY ONCOLOGY | Facility: CLINIC | Age: 51
End: 2021-04-27
Payer: COMMERCIAL

## 2021-04-27 DIAGNOSIS — N17.9 ACUTE KIDNEY FAILURE, UNSPECIFIED: ICD-10-CM

## 2021-04-27 PROCEDURE — 99214 OFFICE O/P EST MOD 30 MIN: CPT | Mod: 95

## 2021-04-27 NOTE — ASSESSMENT
[FreeTextEntry1] : #Anemia 2/2 hgb S trait\par will give folic acid\par no evidence of hemolysis\par b12 and iron wnl - continue supplementation\par \par #leukopenia\par mild. will recheck at [facility]\par \par #DM 2\par A1C 5.3 - controlled\par follows with Dr. Hellerman\par On metformin, glipizide, actos and jenuvia\par \par #BECCA on CKD\par encourage hydration\par \par RTC in 2 months with cbc with diff, CMP

## 2021-04-27 NOTE — CONSULT LETTER
[Dear  ___] : Dear  [unfilled], [Consult Letter:] : I had the pleasure of evaluating your patient, [unfilled]. [Please see my note below.] : Please see my note below. [Consult Closing:] : Thank you very much for allowing me to participate in the care of this patient.  If you have any questions, please do not hesitate to contact me. [Sincerely,] : Sincerely, [FreeTextEntry3] : Kylah Stafford DO, FACCARMEN, FACP\par Medical Oncology and Hematology\par [facility] [place]\par

## 2021-04-27 NOTE — HISTORY OF PRESENT ILLNESS
[Home] : at home, [unfilled] , at the time of the visit. [Medical Office: ([facility])___] : at the medical office located in  [Verbal consent obtained from patient] : the patient, [unfilled] [de-identified] : Patient is f/u via telehealth regarding anemia. He reports no new or worsening physical issues at this time.  [de-identified] : Mr. Moore is a 50 year old man who presents for initial consultation of anemia.\par labs 3/2021: hgb 11.5, hct 33.9, ferritin 113, iron sat 28%; he reports he thinks he has been anemic for about 20 years, he has received vitamin B12 injections; he denies needing PO iron, IV iron, and blood transfusions.\par \par He reports feeling dizzy when going from bent to standing, but otherwise well.\par \par Had covid vaccine 2nd shot last monday. Currently feeling well\par \par Family History: mother not in life; 2 daughters alive and well\par denies history bleeding/clotting\par \par He currently works for human [employer] for the [employer]\par \par Health Maintenance:\par Colonoscopy performed about 1 year ago Dr. Faulkner ([facility])

## 2021-06-21 ENCOUNTER — RESULT REVIEW (OUTPATIENT)
Age: 51
End: 2021-06-21

## 2021-06-21 ENCOUNTER — APPOINTMENT (OUTPATIENT)
Dept: HEMATOLOGY ONCOLOGY | Facility: CLINIC | Age: 51
End: 2021-06-21
Payer: COMMERCIAL

## 2021-06-21 VITALS
RESPIRATION RATE: 16 BRPM | OXYGEN SATURATION: 100 % | DIASTOLIC BLOOD PRESSURE: 69 MMHG | HEART RATE: 75 BPM | BODY MASS INDEX: 32.18 KG/M2 | SYSTOLIC BLOOD PRESSURE: 117 MMHG | HEIGHT: 71.85 IN | TEMPERATURE: 97.8 F | WEIGHT: 235 LBS

## 2021-06-21 VITALS
DIASTOLIC BLOOD PRESSURE: 69 MMHG | TEMPERATURE: 97.8 F | HEIGHT: 71.85 IN | OXYGEN SATURATION: 100 % | WEIGHT: 235 LBS | RESPIRATION RATE: 16 BRPM | BODY MASS INDEX: 32.18 KG/M2 | HEART RATE: 75 BPM | SYSTOLIC BLOOD PRESSURE: 117 MMHG

## 2021-06-21 PROCEDURE — 99072 ADDL SUPL MATRL&STAF TM PHE: CPT

## 2021-06-21 PROCEDURE — 99214 OFFICE O/P EST MOD 30 MIN: CPT

## 2021-06-21 NOTE — ASSESSMENT
[FreeTextEntry1] : #Anemia 2/2 hgb S trait\par will give folic acid - refilled\par no evidence of hemolysis\par b12 and iron wnl - continue supplementation\par TSH wnl\par immunofixation no monoclonal band\par given that he is on metformin will continue to check B12\par may have to consider BM in future\par \par #leukopenia with lymphopenia\par mild. will recheck at [place]. will check JESSEE\par JESSEE, EBV, HEP, HIV, ESR/CRP\par \par #DM 2\par A1C 5.3 - controlled\par follows with Dr. Hellerman\par On metformin, glipizide, actos and jenuvia\par \par \par RTC in 3 months with cbc with diff, CMP, b12, JESSEE, EBV, HEP, HIV, ESR/CRP,

## 2021-06-21 NOTE — CONSULT LETTER
[Dear  ___] : Dear  [unfilled], [Consult Letter:] : I had the pleasure of evaluating your patient, [unfilled]. [Please see my note below.] : Please see my note below. [Consult Closing:] : Thank you very much for allowing me to participate in the care of this patient.  If you have any questions, please do not hesitate to contact me. [Sincerely,] : Sincerely, [FreeTextEntry3] : Kylah Stafford DO, FACCARMEN, FACP\par Medical Oncology and Hematology\par [facility] [place]\par

## 2021-06-21 NOTE — HISTORY OF PRESENT ILLNESS
[Home] : at home, [unfilled] , at the time of the visit. [Medical Office: ([facility])___] : at the medical office located in  [Verbal consent obtained from patient] : the patient, [unfilled] [de-identified] : Mr. Moore is a 50 year old man who presents for initial consultation of anemia.\par labs 3/2021: hgb 11.5, hct 33.9, ferritin 113, iron sat 28%; he reports he thinks he has been anemic for about 20 years, he has received vitamin B12 injections; he denies needing PO iron, IV iron, and blood transfusions.\par \par He reports feeling dizzy when going from bent to standing, but otherwise well.\par \par Had covid vaccine 2nd shot last monday. Currently feeling well\par \par Family History: mother not in life; 2 daughters alive and well\par denies history bleeding/clotting\par \par He currently works for [employer] for the [employer]\par \par Health Maintenance:\par Colonoscopy performed about 1 year ago Dr. Faulkner ([facility])  [de-identified] : Patient seen and examined and here today for follow up\par Feeling well\par Taking folic acid daily\par denies fatigue, cp, sob, PICA

## 2021-08-05 ENCOUNTER — APPOINTMENT (OUTPATIENT)
Dept: ENDOCRINOLOGY | Facility: CLINIC | Age: 51
End: 2021-08-05
Payer: COMMERCIAL

## 2021-08-05 PROCEDURE — 99214 OFFICE O/P EST MOD 30 MIN: CPT | Mod: 95

## 2021-08-05 NOTE — HISTORY OF PRESENT ILLNESS
[FreeTextEntry1] : Aug 05, 2021      Amwell\par \par PCP: Dr. Brandon Faulkner c/o [facility]    Feb 2020  and plans to see soon \par             Card: Dr. Pernell Kirkpatrick c/o [facility] \par             Eyes: Dr. Kylah Gonzales   - Sept 2020 \par \par            CC: "Type 2 Diabetes" (2001) ?NALLELY   Reactive hypoglycemia**\par             +Anti-HARRY 65 ab & daughter DM1\par             Low vitamin B12 (neg intrinsic factor and parietal cell ab 2014)\par             Low Vitamin D\par             (Low WBC)\par \par  metformin  1000 mg BID \par pioglitazone 15 mg in PM (lowered from 30)\par Januavia 100 mg  (via [place])\par glipizide ER 10 mg in PM\par \par Imp/Plan:\par Fingersticks in good range.\par ROV January after labs.  \par \par Mar 25, 2021     Videochat\par \par PCP: Dr. Brandon Faulkner c/o [facility]    Feb 2020  and plans to see soon \par             Card: Dr. Pernell Kirkpatrick [facility] \par             Eyes: Dr. Kylah Gonzales   - Sept 2020 \par \par            CC: "Type 2 Diabetes" (2001) ?NALLELY   Reactive hypoglycemia**\par             +Anti-HARRY 65 ab & daughter DM1\par             Low vitamin B12 (neg intrinsic factor and parietal cell ab 2014)\par             Low Vitamin D\par             (Low WBC)\par \par  metformin  1000 mg BID \par pioglitazone 15 mg in PM (lowered from 30)\par Januavia 100 mg  - needs refill via [place] \par glipizide ER 10 mg in PM\par \par Reports fingerstick sugars are doing well.  \par 3/16/2021  [facility]\par \par LDL 79 on atorvastatin\par glucose 88 mg/dl\par A1c 5.3 %     highest  after cereal \par Hct 33.9  **   (had been ~37 in 2013)\par \par 25 OH vit D 39 \par \par \par Impression:  BS in excellent range.\par Reason for low Hct not apparent.\par May be stuttering slowly down.\par Will ask him to see Hematology regarcing the anemia.\par Updated labs before next visit\par \par \par \par \par Feb 12, 2021    VideoChat  \par \par PCP: Dr. Brandon Faulkner c/o [facility]\par             Card: Dr. Pernell Kirkpatrick [facility] \par             Eyes: Dr. Kylah Gonzales\par \par            CC: "Type 2 Diabetes" (2001) ?NALLELY   Reactive hypoglycemia\par             +Anti-HARRY 65 ab & daughter DM1\par             Low vitamin B12 (neg intrinsic factor and parietal cell ab 2014)\par             Low Vitamin D\par             (Low WBC)\par \par  metformin  1000 mg BID \par pioglitazone 15 mg in PM (lowered from 30)\par Januavia 100 mg  - needs refill via [place] \par glipizide ER 10 mg in PM\par \par \par \par \par Apr 30, 2020  VideoChat [employer]   android    \par \par PCP: Dr. Brandon Faulkner c/o [facility]\par             Card: Dr. Pernell Kirkpatrick [facility] \par             Eyes: Dr. Kylah Gonzales\par \par            CC: "Type 2 Diabetes" (2001) ?NALLELY   Reactive hypoglycemia\par             +Anti-HARRY 65 ab & daughter DM1\par             Low vitamin B12 (neg intrinsic factor and parietal cell ab 2014)\par             Low Vitamin D\par             (Low WBC)\par \par Working from home \par Recent [facility]  by Dr. Faulkner included.\par 2/25/2020 included\par glucose 108 mg/dl  - fasting \par creat 0.99\par ca 9.4\par TP 6.8\par alb 4.4\par low alk phos ***\par WBC 4.1 \par Hct 35.9  ****\par MCV  87.6 \par \par A1c  6.3 ***\par HDL   46\par tri   89\par LDL  65 on atrovastatin\par \par urine micro   9   - ratio\par iron - nl \par ferritin 89 \par \par TSH 1.25\par PSA  0.5 \par \par For diabetes, he is taking:\par \par  metformin  1000 mg BID \par pioglitazone 15 mg in PM (lowered from 30)\par Januavia 100 mg  - needs refill via [place] \par glipizide ER 10 mg in PM\par \par \par Oct 17, 2019\par PCP: Dr. Brandon orta/[facility]\par             Card: Dr. Pernell orta/o [facility] \par             Eyes: Dr. Kylah Gonzales\par \par            CC: "Type 2 Diabetes" (2001) ?NALLELY   Reactive hypoglycemia\par             +Anti-HARRY 65 ab & daughter DM1\par             Low vitamin B12 (neg intrinsic factor and parietal cell ab 2014)\par             Low Vitamin D\par             (Low WBC)\par \par Feels well.\par Recent CBC at [facility] shows Hct 36and at [place] in July 2015 Hct was 46.8.\par He will discuss with Dr. Faulkner at upcoming visit.\par ROV here for diabetes in February 2020.   \par \par \par \par April 26, 2019\par \par     PCP: Dr. Brandon orta/o [facility]\par             Card: Dr. Pernell orta/o [facility] \par             Eyes: Dr. Kylah Gonzales\par \par            CC: "Type 2 Diabetes" (2001) ?NALLELY   Reactive hypoglycemia\par             +Anti-HARRY 65 ab & daughter DM1\par             Low vitamin B12 (neg intrinsic factor and parietal cell ab 2014)\par             Low Vitamin D\par             (Low WBC)\par \par Gets reactive hypoglyemia\par \par Needs refill on metformin  1000 mg BID via [facility]\par OK with\par pioglitazone 15 mg in PM (lowered from 30)\par Januavia 100 mg\par glipizid ER 10 mg in PM\par \par Recent [facility] labs from\par 4/11/2019\par A1c 5.8 %\par  mg/dl \par \par \par Previous notes from [employer] appended below.\par \par  October 4, 2018\par            .\par            PCP: Dr. Brandon orta/o [facility]\par             Card: Dr. Pernell orta/o [facility] \par             Eyes: Dr. Kylah Gonzales\par            CC: "Type 2 Diabetes" (2001) ?NALLELY\par             +Anti-HARRY 65 ab & daughter DM1\par             Low vitamin B12 (neg intrinsic factor and parietal cell ab 2014)\par             Low Vitamin D\par             (Low WBC)\par            .\par            His valsartan was recalled.\par            .\par            Recent [facility] A1c 6.4 % (up from 6.1)\par            urine microalbumin \par            .\par            Actos dose being decreased from 30 to 15 mg in PM\par            Januvia will stay at 100 mg daily. \par            glipizide ER 10 mg in PM\par            metformin 1000 mg BID \par            .\par            .\par            Impression: Diabetes doing very well. \par            .\par            Plan: Same Rx.\par            .Annual eye exam\par            .\par            ==\par            .\par            May 17, 2018\par            .\par            PCP: Dr. Brandon Faulkner c/o [facility]\par             Card: Dr. Pernell Kirkpatrick [facility] \par             Eyes: Dr. Matt rico [facility]\par            CC: "Type 2 Diabetes" (2001) ?NALLELY\par             +Anti-HARRY 65 ab & daughter DM1\par             Low vitamin B12 (neg intrinsic factor and parietal cell ab 2014)\par             Low Vitamin D\par             (Low WBC)\par            .\par            INTERNET DOWN.\par            .\par            Doing well.\par            .\par            his phone is \par            Rx / Refills Continue Metformin HCl 1000MG, , 1 tablet twice a day \par             Continue GlipiZIDE XL 10 mg, , 1 tablet Once a day in PM \par             Continue Januvia 100 MG, , 1 tablet Once a day \par             Continue Actos 30 MG, , 1 tablet once a day in PM \par            .\par            Dec 14, 2017\par            .\par            PCP: Dr. Brandon Faulkner c/o [facility]\par             Card: Dr. Pernell Kirkpatrick [facility] \par             Eyes: Dr. Matt rico [facility]\par            CC: "Type 2 Diabetes" (2001) ?NALLELY\par             +Anti-HARRY 65 ab & daughter DM1\par             Low vitamin B12 (neg intrinsic factor and parietal cell ab 2014)\par             Low Vitamin D\par             (Low WBC)\par            .\par            Still takiing:.\par            .\par            Actos 30 mg in PM \par            glipizide ER 10 mg in PM\par            Onglyza 5 mg daily-> Januvia 100 mg daily\par            Metformin 1000 mg BID\par            .\par            Labs Nov 30 included\par            microal ratio 5\par            glucose 131 fasting \par            creatinine 1.01 **\par            HbA1c 6.1 %\par            .\par            Impression: Since Actos decreased from 30 to 15 mg the FBS drifted up a bit. Has been too busy to test. \par            .\par            Plan: Discussed [facility].\par            Eye exam.\par            ROV 4 months. \par            .,\par            .\par            ==\par            .\par            August 10, 2017\par            .\par            PCP: Dr. Brandon Faulkner [facility]\par             Card: Dr. Pernell Kirkpatrick [facility]\par            CC: "DM2" (2001) ?NALLELY\par             +Anti-HARRY 65 ab & daughter DM1\par             Low vitamin B12 (neg intrinsic factor and parietal cell ab 2014)\par             Low Vitamin D\par             (Low WBC)\par            .\par            .\par            Actos 30 mg in PM \par            glipizide ER 10 mg in PM\par            Onglyza 5 mg daily-> Januvia 100 mg daily\par            Metformin 1000 mg BID\par            .,\par            Takes B12, Vit D OTC daily as well. \par            .\par            Goal will be to taper off of Actos. \par            .\par            Most recent labs ([facility]) from\par            7/27/2017\par            AOR557 mg/dl\par            gastrin 31\par            gliadin ab neg\par            HbA1c 5.9 % *** (as high as 8.0 in August 2011) \par            .\par            Brings in fingerstick BS.\par            FBS .\par            Some LOW ac lunc 49, 56\par            .\par            Impression: Low AC lunch may be related to reactive to \par            oatmeal or delayed effect of PM glipizide ER.\par            .\par            Goal will be to taper off of Actos.\par            .\par            Plan: Increse exercise.\par            Decrease Actos to 15 mg.\par            ROV 3 months after labs.\par            .\par            ==\par            .\par            March 9, 2017\par            .\par            PCP: Dr. Ken Wilson [facility]\par             Card: Dr. Pernell Kirkpatrick [facility]\par            CC: "DM2" (2001) ?NALLELY\par             +Anti-HARRY 65 ab & daughter DM1\par             Low vitamin B12 (neg intrinsic factor and parietal cell ab 2014)\par             (Low WBC)\par            .\par            Medications currently include:\par            .\par            Actos 30 mg in PM\par            glipizide ER 10 mg in PM\par            Onglyza 5 mg daily-\par            Metformin 1000 mg BID\par            .\par            Also taking metoprolol 25 mg in PM\par            .\par            Impression: Doing well.\par            Fingerstick BS in good range.\par            Hopefully this will be verified by A1c\par            .\par            Plan: Annual eye exam\par            .\par            ==\par            .\par            Nov 10, 2016\par            .\par            PCP: Dr. Ken Wilson [facility]\par            CC: "DM2" (2001) ?NALLELY\par             +Anti-HARRY 65 ab & daughter DM1\par             Low vitamin B12 (neg intrinsic factor and parietal cell ab 2014)\par             (Low WBC)\par            .\par            Had panic attacks\par            Told of low K\par            Got too low with 2 glip in PM \par            .\par            Needs Onglyza. \par            On vit D and B12\par            .\par            Impression: Stable.\par            .\par            Plan: Annual eye exam\par            Continue:\par            Actos 30 mg in PM\par            glipizide ER 10 mg in PM\par            Onglyza 5 mg daily\par            Metformin 1000 mg BID\par            .\par            ==\par            .\par            July 21, 2016\par            .\par            PCP: Dr. Ken Wilson [facility]\par            CC: "DM2" (2001) ?NALLELY\par             +Anti-HARRY 65 ab & daughter DM1\par             Low vitamin B12 (neg intrinsic factor and parietal cell ab 2014)\par             (Low WBC)\par            .\par            Last visit advised him to increase PM glipizide ER 10 mg to two pills in AM, but he did not. \par            Did not bring records to this visit. (but he reports sugars in good range on current Rx.\par            .\par            Impression: Recent A1c of 6.2% suggests good control.\par            .\par            Plan: Continue glipizide ER 10 mg at one pill in PM along with the other medications for diabetes. \par            Updated A1c before he returns in3-4 mons. \par            .\par            ==\par            .\par            March 10, 2016\par            .\par            PCP: Dr. Ken orta/o [facility]\par            CC: "DM2" (2001)\par             +Anti-HARRY 65 ab & daughter DM1\par             Low vitamin B12 (neg intrinsic factor and parietal cell ab 2014)\par             (Low WBC)\par            3/3/2016 [facility] labs included\par            .\par             mg/dl\par            Fructosamine 327 (190-270)\par            .\par            HbA1c 7.3 %\par            .\par            Actos 30 mg in PM\par            glipizide ER 10 mg in PM\par            Onglyza 5 mg daily\par            Metformin 1000 mg BID\par            .\par            .\par            Plan: Next labs, check B12, Vit D, WBC, A1c\par            Because of elevated FBS can increase glipizide ER 10 mg to\par            two pills in PM. \par            .\par            "I feel fine, lost some weight...." -Lost 15 lbs since April. \par            .\par            .\par            ==\par            .\par            November 12, 2015\par            .\par            PCP: Dr. Ken orta/[facility]\par            CC: "DM2" (2001)\par             +Anti-HARRY 65 ab & daughter DM1\par             Low vitamin B12 (neg intrinsic factor and parietal cell ab 2014)\par             (Low WBC)\par            .\par            Labs July 2 included A1c 7.2 %\par            B12 155 (180-9000)\par            WBC 2.4 \par            .\par            Actos 30 mg in PM\par            glipizide ER 5 mg in PM\par            Onglyza 5 mg daily\par            Metformin 1000 mg BID\par            .\par            Impression: 17 yo daughter keeping him busy. She has insulin pump.\par            He has not been testing his own sugars.\par            .\par            Plan: Updated A1c. -jh\par            .\par            .\par            ==\par            .\par            July 2, 2015\par            .\par            .\par            PCP: Dr. 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 Not clear if rise in the FBS is a sign of this, but will increase PM glipizide to 10 mg and ROV in 4 months.\par            .\par            .===\par            Nov 17, 2014\par            PCP: Dr. 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 \par            .\par            Because of +HARRY 65 and daughter's DM1, I encouraged him to\par            see Dr. Imtiaz De La O at [facility] for advice to decrease chances of evolution from DM2 to DM1. He reports that Dr. De La O is not in his network. I will check with [facility] faculty for their advice:\par            [email or web address]\par            [phone]\par            .\par            Remains on:\par            Actos 30 mg in PM\par            glipizide ER 5 mg in PM\par            Onglyza 5 mg daily\par            Metformin 1000 mg BID.\par            .\par            Impression: Doing well. Last labs in May 6.1%\par            .\par            Plan: Same Rx. ROV in 4 months after updated labs. Thank you.\par            .\par            ===\par            .\par            May 27, 2014\par            PCP: Dr. Wilson\par            CC: DM2 (2001);\par            .\par            Last note appended below.\par            On metformin 1000 mg BID\par            Onglyza 5 mg daily\par            glipizide XR 5 mg\par            Actos 30 mg in PM.\par            .\par            Fingerstick BS in good range. FBS about 110 mg/dl average.\par            Sees Ophthalmology at [facility]. \par            .\par            Impression: His daughter has Type 1 Diabetes. His recent blood tests show post breakfast BS of 131 and C-peptide of 5.07 (0.8-3.1) at [facility] suggesting he currently makes considerable insulin and is insulin resistant. 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 For that reason, I suggested to him that he may benefit from evaluation at [facility] (Dr. Vegas fax [phone]) for possible study or strategy to decrease progression to DM1 and he will ask you advice for a referral in that regard. The Anti parietal cell ab is negative. B12 is borderline. \par             Thank you. -jh\par            .\par            =========\par            Dec 16, 2013\par            PCP: Dr. Bhardwaj/Dr. Faulkner\par            CC: DM2, since 2001, low B12\par            .\par            15 yo daughter has DM1 diagnosed age 9 [address] Erickson. \par            He stopped Actos prescriibedd by Dr. Silva b/o concern about bladder cancer. Dr. Silva had him on 30 mg and I restarted at 15 mg, but he wants the 30 mg Actos now that he is back on it.\par            .\par            I switched him to metformin XR 1000 mg BID, but he prefers the regular metformin as it is smaller and less expensive.\par            .\par            Because the FBS (and A1c) have drifted up a bit, I have advised he increase the PM glipizide 2.5 mg XR to 5 mg XR.\par            .\par            He will remain on 5 mg Onglyza every AM.\par            .\par            B12 is only 202. \par            Imp: Lower B12 may be a metformin effect. \par            .\par            Plan: As above.\par            .\par            ====\par            August 19, 2013\par            PCP: Dr. Ken Wilson/Dr. Faulkner\par            CC: DM2\par            .\par            Recent A1c again 5.9%\par            B12 again low\par            .\par            On Onglyza 5 mg\par            Actos 15 in PM\par            Metformin 1000 mg BID\par            glipizide ER 2.5 mg in PM\par            .\par            Brought NO records. \par            Impression: BS in good range.\par            Plan: Same Rx. ROV 4 months.\par            Get B12 shot at [facility]. \par            .\par            ====\par            April 1, 2013 CC: Diabetes \par            Meds the same.\par            A1c is 5.9% B12 183.\par            Doing well. Highest BS is 200 after instant oatmeal.\par            Eyes checked recently at [facility].\par            Feels well.\par            He will hand deliver labs to Dr. Wilson\par            .\par            .\par            ++++++\par            Visits for DM2. Meds the same. Recent A1c 6.1%. Had recent eye exam at [facility]. Feels well. Previous note appended below:\par            .s. \par            .\par            "July 25, 2012 Re: Michael Moore [birth year]\par            Attn: Dr. Ken Wilson\par            Mr. Moore is 42 yo, father of a 21 and 15 yo, works at the [employer] in  and he returns regarding DM diagnosed in 2001. He is currently taking:\par            metformin 1000 mg BID\par            Onglyza 5 mg qAM\par            Actos 15 mg qPM \par            glipizide 2.5 mg qPM \par            Recent HbA1c is 6.1 (down from 8.0 in August but up from 5.8% last visit). However he did not bring in his records today. \par            No foot discomfort; eye exam a few month ago at [facility]. \par            Weight today is 275 lbs fully clothed, 6 ft tall.\par            Impression: Diabetes appears to be under excellent control\par            Plan: Same Rx. \par            Sincerely,\par            James Hellerman MD\par            Endocrinology [address] [place] [address]".

## 2021-09-20 ENCOUNTER — RESULT REVIEW (OUTPATIENT)
Age: 51
End: 2021-09-20

## 2021-09-20 ENCOUNTER — APPOINTMENT (OUTPATIENT)
Dept: HEMATOLOGY ONCOLOGY | Facility: CLINIC | Age: 51
End: 2021-09-20
Payer: COMMERCIAL

## 2021-09-20 VITALS
WEIGHT: 244.8 LBS | BODY MASS INDEX: 33.52 KG/M2 | HEART RATE: 68 BPM | TEMPERATURE: 97.3 F | RESPIRATION RATE: 16 BRPM | SYSTOLIC BLOOD PRESSURE: 117 MMHG | DIASTOLIC BLOOD PRESSURE: 76 MMHG | HEIGHT: 71.85 IN | OXYGEN SATURATION: 100 %

## 2021-09-20 PROCEDURE — 99214 OFFICE O/P EST MOD 30 MIN: CPT

## 2021-09-20 NOTE — ASSESSMENT
[FreeTextEntry1] : #Anemia 2/2 hgb S trait\par will give folic acid - refilled\par no evidence of hemolysis\par b12 and iron wnl - continue supplementation\par TSH wnl\par ESR/CRP wnl\par immunofixation no monoclonal band\par given that he is on metformin will continue to check B12\par 9/20/2021 - vs and labs reviewed - hgb stable 10.7. Will continue to monitor for now. \par \par #leukopenia with lymphopenia\par 9/20/2021 - mild and stable. pending JESSEE, EBV, HEP, HIV, ESR/CRP\par once work up is back will determine whether he needs a BM bx however - this remains chronic and stable\par \par #DM 2\par A1C 5.3 - controlled\par follows with Dr. Hellerman\par On metformin, glipizide, actos and jenuvia\par \par \par RTC in 4 months with cbc with diff, CMP

## 2021-09-20 NOTE — HISTORY OF PRESENT ILLNESS
[de-identified] : Mr. Moore is a 50 year old man who presents for initial consultation of anemia.\par labs 3/2021: hgb 11.5, hct 33.9, ferritin 113, iron sat 28%; he reports he thinks he has been anemic for about 20 years, he has received vitamin B12 injections; he denies needing PO iron, IV iron, and blood transfusions.\par \par He reports feeling dizzy when going from bent to standing, but otherwise well.\par \par Had covid vaccine 2nd shot last monday. Currently feeling well\par \par Family History: mother not in life; 2 daughters alive and well\par denies history bleeding/clotting\par \par He currently works for human [employer] for the [employer]\par \par Health Maintenance:\par Colonoscopy performed about 1 year ago Dr. Faulkner ([facility])  [de-identified] : Patient seen and examined and here today for follow up\par He reports compliance with folic acid and vitamin B12. \par He feels like his normal self

## 2021-09-20 NOTE — CONSULT LETTER
[Dear  ___] : Dear  [unfilled], [Consult Letter:] : I had the pleasure of evaluating your patient, [unfilled]. [Please see my note below.] : Please see my note below. [Consult Closing:] : Thank you very much for allowing me to participate in the care of this patient.  If you have any questions, please do not hesitate to contact me. [Sincerely,] : Sincerely, [FreeTextEntry3] : Kylah Stafford DO, FACCARMEN, FACP\par Medical Oncology and Hematology\par [facility] [place]\par

## 2022-01-06 ENCOUNTER — APPOINTMENT (OUTPATIENT)
Dept: ENDOCRINOLOGY | Facility: CLINIC | Age: 52
End: 2022-01-06
Payer: COMMERCIAL

## 2022-01-06 PROCEDURE — 99214 OFFICE O/P EST MOD 30 MIN: CPT | Mod: 95

## 2022-01-06 NOTE — HISTORY OF PRESENT ILLNESS
[Home] : at home, [unfilled] , at the time of the visit. [Medical Office: ([facility])___] : at the medical office located in  [Verbal consent obtained from patient] : the patient, [unfilled] [FreeTextEntry1] : Jan 06, 2022      Amwell\par \par PCP: Dr. Brandon Faulkner c/o [facility]    Saw Nov 2022 for CPX  \par             Card: Dr. Pernell Kirkpatrick [facility] \par             Eyes: Dr. Kylah Gonzales   - Sept 2020 \par             Hematology:  Dr. Kylah Stafford (anemia)\par \par            CC: "Type 2 Diabetes" (2001) ?NALLELY   Reactive hypoglycemia**\par             +Anti-HARRY 65 ab & daughter DM1\par             Low vitamin B12 (neg intrinsic factor and parietal cell ab 2014)\par             Low Vitamin D\par             (Low WBC)\par \par  metformin  1000 mg BID \par pioglitazone 15 mg in PM (lowered from 30)\par Januvia 100 mg  (via [facility])\par glipizide ER 10 mg in PM\par \par He reports fingerstick BS in good range.\par No hypoglycemia.\par \par Impression:  Diabetes in good control.\par Anemia being addressed by GI and Hematology.\par Plan:  Updated labs before May visit.\par Rx renewed.  \par \par \par Aug 05, 2021      Amwell\par \par PCP: Dr. Brandon Faulkner c/o [facility]    Feb 2020  and plans to see soon \par             Card: Dr. Pernell Kirkpatrick [facility] \par             Eyes: Dr. Kylah Gonzales   - Sept 2020 \par \par            CC: "Type 2 Diabetes" (2001) ?NALLELY   Reactive hypoglycemia**\par             +Anti-HARRY 65 ab & daughter DM1\par             Low vitamin B12 (neg intrinsic factor and parietal cell ab 2014)\par             Low Vitamin D\par             (Low WBC)\par \par  metformin  1000 mg BID \par pioglitazone 15 mg in PM (lowered from 30)\par Januavia 100 mg  (via [facility])\par glipizide ER 10 mg in PM\par \par Imp/Plan:\par Fingersticks in good range.\par ROV January after labs.  \par \par Mar 25, 2021     Videochat\par \par PCP: Dr. Brandon Faulkner c/o [facility]    Feb 2020  and plans to see soon \par             Card: Dr. Pernell Kirkpatrick [facility] \par             Eyes: Dr. Kylah Gonzales   - Sept 2020 \par \par            CC: "Type 2 Diabetes" (2001) ?NALLELY   Reactive hypoglycemia**\par             +Anti-HARRY 65 ab & daughter DM1\par             Low vitamin B12 (neg intrinsic factor and parietal cell ab 2014)\par             Low Vitamin D\par             (Low WBC)\par \par  metformin  1000 mg BID \par pioglitazone 15 mg in PM (lowered from 30)\par Januavia 100 mg  - needs refill via [facility] \par glipizide ER 10 mg in PM\par \par Reports fingerstick sugars are doing well.  \par 3/16/2021  [facility]\par \par LDL 79 on atorvastatin\par glucose 88 mg/dl\par A1c 5.3 %     highest  after cereal \par Hct 33.9  **   (had been ~37 in 2013)\par \par 25 OH vit D 39 \par \par \par Impression:  BS in excellent range.\par Reason for low Hct not apparent.\par May be stuttering slowly down.\par Will ask him to see Hematology regarcing the anemia.\par Updated labs before next visit\par \par \par \par \par Feb 12, 2021    VideoChat  \par \par PCP: Dr. Brandon Faulkner c/o [facility]\par             Card: Dr. Pernell orta/o [facility] \par             Eyes: Dr. Kylah Gonzales\par \par            CC: "Type 2 Diabetes" (2001) ?NALLELY   Reactive hypoglycemia\par             +Anti-HARRY 65 ab & daughter DM1\par             Low vitamin B12 (neg intrinsic factor and parietal cell ab 2014)\par             Low Vitamin D\par             (Low WBC)\par \par  metformin  1000 mg BID \par pioglitazone 15 mg in PM (lowered from 30)\par Januavia 100 mg  - needs refill via [facility] \par glipizide ER 10 mg in PM\par \par \par \par \par Apr 30, 2020  VideoChat [employer]   android    \par \par PCP: Dr. Brandon Faulkner c/o [facility]\par             Card: Dr. Pernell orta/o [facility] \par             Eyes: Dr. Kylah Gonzales\par \par            CC: "Type 2 Diabetes" (2001) ?NALLELY   Reactive hypoglycemia\par             +Anti-HARRY 65 ab & daughter DM1\par             Low vitamin B12 (neg intrinsic factor and parietal cell ab 2014)\par             Low Vitamin D\par             (Low WBC)\par \par Working from home \par Recent [facility]  by Dr. Faulkner included.\par 2/25/2020 included\par glucose 108 mg/dl  - fasting \par creat 0.99\par ca 9.4\par TP 6.8\par alb 4.4\par low alk phos ***\par WBC 4.1 \par Hct 35.9  ****\par MCV  87.6 \par \par A1c  6.3 ***\par HDL   46\par tri   89\par LDL  65 on atrovastatin\par \par urine micro   9   - ratio\par iron - nl \par ferritin 89 \par \par TSH 1.25\par PSA  0.5 \par \par For diabetes, he is taking:\par \par  metformin  1000 mg BID \par pioglitazone 15 mg in PM (lowered from 30)\par Januavia 100 mg  - needs refill via [facility] \par glipizide ER 10 mg in PM\par \par \par Oct 17, 2019\par PCP: Dr. Brandon Faulkner c/o [facility]\par             Card: Dr. Pernell Kirkpatrick [facility] \par             Eyes: Dr. Kylah Gonzales\par \par            CC: "Type 2 Diabetes" (2001) ?NALLELY   Reactive hypoglycemia\par             +Anti-HARRY 65 ab & daughter DM1\par             Low vitamin B12 (neg intrinsic factor and parietal cell ab 2014)\par             Low Vitamin D\par             (Low WBC)\par \par Feels well.\par Recent [facility] at [facility] shows Hct 36and at [place] in July 2015 Hct was 46.8.\par He will discuss with Dr. Faulkner at upcoming visit.\par ROV here for diabetes in February 2020.   \par \par \par \par April 26, 2019\par \par     PCP: Dr. Brandon Faulkner c/o [facility]\par             Card: Dr. Pernell Kirkpatrick [facility] \par             Eyes: Dr. Kylah Gonzales\par \par            CC: "Type 2 Diabetes" (2001) ?NALLELY   Reactive hypoglycemia\par             +Anti-HARRY 65 ab & daughter DM1\par             Low vitamin B12 (neg intrinsic factor and parietal cell ab 2014)\par             Low Vitamin D\par             (Low WBC)\par \par Gets reactive hypoglyemia\par \par Needs refill on metformin  1000 mg BID via [facility]\par OK with\par pioglitazone 15 mg in PM (lowered from 30)\par Januavia 100 mg\par glipizid ER 10 mg in PM\par \par Recent [facility] labs from\par 4/11/2019\par A1c 5.8 %\par  mg/dl \par \par \par Previous notes from [employer] appended below.\par \par  October 4, 2018\par            .\par            PCP: Dr. Brandon Faulkner c/o [facility]\par             Card: Dr. Pernell Kirkpatrick [facility] \par             Eyes: Dr. Kylah Gonzales\par            CC: "Type 2 Diabetes" (2001) ?NALLELY\par             +Anti-HARRY 65 ab & daughter DM1\par             Low vitamin B12 (neg intrinsic factor and parietal cell ab 2014)\par             Low Vitamin D\par             (Low WBC)\par            .\par            His valsartan was recalled.\par            .\par            Recent [facility] A1c 6.4 % (up from 6.1)\par            urine microalbumin \par            .\par            Actos dose being decreased from 30 to 15 mg in PM\par            Januvia will stay at 100 mg daily. \par            glipizide ER 10 mg in PM\par            metformin 1000 mg BID \par            .\par            .\par            Impression: Diabetes doing very well. \par            .\par            Plan: Same Rx.\par            .Annual eye exam\par            .\par            ==\par            .\par            May 17, 2018\par            .\par            PCP: Dr. Brandon Faulkner c/o [facility]\par             Card: Dr. Pernell Kirkpatrick [facility] \par             Eyes: Dr. Matt rico [facility]\par            CC: "Type 2 Diabetes" (2001) ?NALLELY\par             +Anti-HARRY 65 ab & daughter DM1\par             Low vitamin B12 (neg intrinsic factor and parietal cell ab 2014)\par             Low Vitamin D\par             (Low WBC)\par            .\par            INTERNET DOWN.\par            .\par            Doing well.\par            .\par            his phone is \par            Rx / Refills Continue Metformin HCl 1000MG, , 1 tablet twice a day \par             Continue GlipiZIDE XL 10 mg, , 1 tablet Once a day in PM \par             Continue Januvia 100 MG, , 1 tablet Once a day \par             Continue Actos 30 MG, , 1 tablet once a day in PM \par            .\par            Dec 14, 2017\par            .\par            PCP: Dr. Brandon Faulkner c/o [facility]\par             Card: Dr. Pernell Kirkpatrick [facility] \par             Eyes: Dr. Matt rico [facility]\par            CC: "Type 2 Diabetes" (2001) ?NALLELY\par             +Anti-HARRY 65 ab & daughter DM1\par             Low vitamin B12 (neg intrinsic factor and parietal cell ab 2014)\par             Low Vitamin D\par             (Low WBC)\par            .\par            Still takiing:.\par            .\par            Actos 30 mg in PM \par            glipizide ER 10 mg in PM\par            Onglyza 5 mg daily-> Januvia 100 mg daily\par            Metformin 1000 mg BID\par            .\par            Labs Nov 30 included\par            microal ratio 5\par            glucose 131 fasting \par            creatinine 1.01 **\par            HbA1c 6.1 %\par            .\par            Impression: Since Actos decreased from 30 to 15 mg the FBS drifted up a bit. Has been too busy to test. \par            .\par            Plan: Discussed [facility].\par            Eye exam.\par            ROV 4 months. \par            .,\par            .\par            ==\par            .\par            August 10, 2017\par            .\par            PCP: Dr. Brandon Faulkner [facility]\par             Card: Dr. Pernell Kirkpatrick [facility]\par            CC: "DM2" (2001) ?NALLELY\par             +Anti-HARRY 65 ab & daughter DM1\par             Low vitamin B12 (neg intrinsic factor and parietal cell ab 2014)\par             Low Vitamin D\par             (Low WBC)\par            .\par            .\par            Actos 30 mg in PM \par            glipizide ER 10 mg in PM\par            Onglyza 5 mg daily-> Januvia 100 mg daily\par            Metformin 1000 mg BID\par            .,\par            Takes B12, Vit D OTC daily as well. \par            .\par            Goal will be to taper off of Actos. \par            .\par            Most recent labs ([facility]) from\par            7/27/2017\par            SGC509 mg/dl\par            gastrin 31\par            gliadin ab neg\par            HbA1c 5.9 % *** (as high as 8.0 in August 2011) \par            .\par            Brings in fingerstick BS.\par            FBS .\par            Some LOW ac lunc 49, 56\par            .\par            Impression: Low AC lunch may be related to reactive to \par            oatmeal or delayed effect of PM glipizide ER.\par            .\par            Goal will be to taper off of Actos.\par            .\par            Plan: Increse exercise.\par            Decrease Actos to 15 mg.\par            ROV 3 months after labs.\par            .\par            ==\par            .\par            March 9, 2017\par            .\par            PCP: Dr. Ken Wilson [facility]\par             Card: Dr. Pernell Kirkpatrick [facility]\par            CC: "DM2" (2001) ?NALLELY\par             +Anti-HARRY 65 ab & daughter DM1\par             Low vitamin B12 (neg intrinsic factor and parietal cell ab 2014)\par             (Low WBC)\par            .\par            Medications currently include:\par            .\par            Actos 30 mg in PM\par            glipizide ER 10 mg in PM\par            Onglyza 5 mg daily-\par            Metformin 1000 mg BID\par            .\par            Also taking metoprolol 25 mg in PM\par            .\par            Impression: Doing well.\par            Fingerstick BS in good range.\par            Hopefully this will be verified by A1c\par            .\par            Plan: Annual eye exam\par            .\par            ==\par            .\par            Nov 10, 2016\par            .\par            PCP: Dr. Ken Wilson [facility]\par            CC: "DM2" (2001) ?NALLELY\par             +Anti-HARRY 65 ab & daughter DM1\par             Low vitamin B12 (neg intrinsic factor and parietal cell ab 2014)\par             (Low WBC)\par            .\par            Had panic attacks\par            Told of low K\par            Got too low with 2 glip in PM \par            .\par            Needs Onglyza. \par            On vit D and B12\par            .\par            Impression: Stable.\par            .\par            Plan: Annual eye exam\par            Continue:\par            Actos 30 mg in PM\par            glipizide ER 10 mg in PM\par            Onglyza 5 mg daily\par            Metformin 1000 mg BID\par            .\par            ==\par            .\par            July 21, 2016\par            .\par            PCP: Dr. Ken Wilson [facility]\par            CC: "DM2" (2001) ?NALLELY\par             +Anti-HARRY 65 ab & daughter DM1\par             Low vitamin B12 (neg intrinsic factor and parietal cell ab 2014)\par             (Low WBC)\par            .\par            Last visit advised him to increase PM glipizide ER 10 mg to two pills in AM, but he did not. \par            Did not bring records to this visit. (but he reports sugars in good range on current Rx.\par            .\par            Impression: Recent A1c of 6.2% suggests good control.\par            .\par            Plan: Continue glipizide ER 10 mg at one pill in PM along with the other medications for diabetes. \par            Updated A1c before he returns in3-4 mons. \par            .\par            ==\par            .\par            March 10, 2016\par            .\par            PCP: Dr. Ken Wilson [facility]\par            CC: "DM2" (2001)\par             +Anti-HARRY 65 ab & daughter DM1\par             Low vitamin B12 (neg intrinsic factor and parietal cell ab 2014)\par             (Low WBC)\par            3/3/2016 [facility] labs included\par            .\par             mg/dl\par            Fructosamine 327 (190-270)\par            .\par            HbA1c 7.3 %\par            .\par            Actos 30 mg in PM\par            glipizide ER 10 mg in PM\par            Onglyza 5 mg daily\par            Metformin 1000 mg BID\par            .\par            .\par            Plan: Next labs, check B12, Vit D, WBC, A1c\par            Because of elevated FBS can increase glipizide ER 10 mg to\par            two pills in PM. \par            .\par            "I feel fine, lost some weight...." -Lost 15 lbs since April. \par            .\par            .\par            ==\par            .\par            November 12, 2015\par            .\par            PCP: Dr. Ken orta/o [facility]\par            CC: "DM2" (2001)\par             +Anti-HARRY 65 ab & daughter DM1\par             Low vitamin B12 (neg intrinsic factor and parietal cell ab 2014)\par             (Low WBC)\par            .\par            Labs July 2 included A1c 7.2 %\par            B12 155 (180-9000)\par            WBC 2.4 \par            .\par            Actos 30 mg in PM\par            glipizide ER 5 mg in PM\par            Onglyza 5 mg daily\par            Metformin 1000 mg BID\par            .\par            Impression: 15 yo daughter keeping him busy. She has insulin pump.\par            He has not been testing his own sugars.\par            .\par            Plan: Updated A1c. -jh\par            .\par            .\par            ==\par            .\par            July 2, 2015\par            .\par            .\par            PCP: Dr. 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 Not clear if rise in the FBS is a sign of this, but will increase PM glipizide to 10 mg and ROV in 4 months.\par            .\par            .===\par            Nov 17, 2014\par            PCP: Dr. 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 \par            .\par            Because of +HARRY 65 and daughter's DM1, I encouraged him to\par            see Dr. Imtiaz De La O at [facility] for advice to decrease chances of evolution from DM2 to DM1. He reports that Dr. De La O is not in his network. I will check with [facility] faculty for their advice:\par            [email or web address]\par            [phone]\par            .\par            Remains on:\par            Actos 30 mg in PM\par            glipizide ER 5 mg in PM\par            Onglyza 5 mg daily\par            Metformin 1000 mg BID.\par            .\par            Impression: Doing well. Last labs in May 6.1%\par            .\par            Plan: Same Rx. ROV in 4 months after updated labs. Thank you.\par            .\par            ===\par            .\par            May 27, 2014\par            PCP: Dr. Wilson\par            CC: DM2 (2001);\par            .\par            Last note appended below.\par            On metformin 1000 mg BID\par            Onglyza 5 mg daily\par            glipizide XR 5 mg\par            Actos 30 mg in PM.\par            .\par            Fingerstick BS in good range. FBS about 110 mg/dl average.\par            Sees Ophthalmology at [facility]. \par            .\par            Impression: His daughter has Type 1 Diabetes. His recent blood tests show post breakfast BS of 131 and C-peptide of 5.07 (0.8-3.1) at [facility] suggesting he currently makes considerable insulin and is insulin resistant. 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 For that reason, I suggested to him that he may benefit from evaluation at [facility] (Dr. Vegas fax [phone]) for possible study or strategy to decrease progression to DM1 and he will ask you advice for a referral in that regard. The Anti parietal cell ab is negative. B12 is borderline. \par             Thank you. -\par            .\par            =========\par            Dec 16, 2013\par            PCP: Dr. Bhardwaj/Dr. Faulkner\par            CC: DM2, since 2001, low B12\par            .\par            13 yo daughter has DM1 diagnosed age 9 [address] Erickson. \par            He stopped Actos prescriibedd by Dr. Silva b/o concern about bladder cancer. Dr. Silva had him on 30 mg and I restarted at 15 mg, but he wants the 30 mg Actos now that he is back on it.\par            .\par            I switched him to metformin XR 1000 mg BID, but he prefers the regular metformin as it is smaller and less expensive.\par            .\par            Because the FBS (and A1c) have drifted up a bit, I have advised he increase the PM glipizide 2.5 mg XR to 5 mg XR.\par            .\par            He will remain on 5 mg Onglyza every AM.\par            .\par            B12 is only 202. \par            Imp: Lower B12 may be a metformin effect. \par            .\par            Plan: As above.\par            .\par            ====\par            August 19, 2013\par            PCP: Dr. Ken Wilson/Dr. Faulkner\par            CC: DM2\par            .\par            Recent A1c again 5.9%\par            B12 again low\par            .\par            On Onglyza 5 mg\par            Actos 15 in PM\par            Metformin 1000 mg BID\par            glipizide ER 2.5 mg in PM\par            .\par            Brought NO records. \par            Impression: BS in good range.\par            Plan: Same Rx. ROV 4 months.\par            Get B12 shot at [facility]. \par            .\par            ====\par            April 1, 2013 CC: Diabetes \par            Meds the same.\par            A1c is 5.9% B12 183.\par            Doing well. Highest BS is 200 after instant oatmeal.\par            Eyes checked recently at [facility].\par            Feels well.\par            He will hand deliver labs to Dr. Wilson\par            .\par            .\par            ++++++\par            Visits for DM2. Meds the same. Recent A1c 6.1%. Had recent eye exam at [facility]. Feels well. Previous note appended below:\par            .s. \par            .\par            "July 25, 2012 Re: Michael Moore [birth year]\par            Attn: Dr. Ken Wilson\par            Mr. Moore is 40 yo, father of a 21 and 15 yo, works at the [employer] in  and he returns regarding DM diagnosed in 2001. He is currently taking:\par            metformin 1000 mg BID\par            Onglyza 5 mg qAM\par            Actos 15 mg qPM \par            glipizide 2.5 mg qPM \par            Recent HbA1c is 6.1 (down from 8.0 in August but up from 5.8% last visit). However he did not bring in his records today. \par            No foot discomfort; eye exam a few month ago at [facility]. \par            Weight today is 275 lbs fully clothed, 6 ft tall.\par            Impression: Diabetes appears to be under excellent control\par            Plan: Same Rx. \par            Sincerely,\par            James Hellerman MD\par            Endocrinology [address] [place] [id number]".

## 2022-01-19 ENCOUNTER — NON-APPOINTMENT (OUTPATIENT)
Age: 52
End: 2022-01-19

## 2022-01-24 ENCOUNTER — APPOINTMENT (OUTPATIENT)
Dept: HEMATOLOGY ONCOLOGY | Facility: CLINIC | Age: 52
End: 2022-01-24
Payer: COMMERCIAL

## 2022-01-24 ENCOUNTER — RESULT REVIEW (OUTPATIENT)
Age: 52
End: 2022-01-24

## 2022-01-24 VITALS
TEMPERATURE: 98.4 F | WEIGHT: 240 LBS | DIASTOLIC BLOOD PRESSURE: 58 MMHG | BODY MASS INDEX: 32.86 KG/M2 | HEART RATE: 63 BPM | SYSTOLIC BLOOD PRESSURE: 90 MMHG | HEIGHT: 71.85 IN | OXYGEN SATURATION: 100 %

## 2022-01-24 PROCEDURE — 36415 COLL VENOUS BLD VENIPUNCTURE: CPT

## 2022-01-24 PROCEDURE — 99213 OFFICE O/P EST LOW 20 MIN: CPT | Mod: 25

## 2022-01-24 NOTE — ASSESSMENT
[FreeTextEntry1] : #Anemia 2/2 hgb S trait\par will give folic acid - refilled\par no evidence of hemolysis\par b12 and iron wnl - continue supplementation\par TSH wnl\par ESR/CRP wnl\par immunofixation no monoclonal band\par given that he is on metformin will continue to check B12\par 9/20/2021 - vs and labs reviewed - hgb stable 10.7. Will continue to monitor for now. \par 1/24/2022 11.0, stable\par \par #leukopenia with lymphopenia\par 9/20/2021 - mild and stable. pending JESSEE, EBV, HEP, HIV, ESR/CRP\par once work up is back will determine whether he needs a BM bx however - this remains chronic and stable\par 1/24/2022 WBC 4.2, stable\par \par #DM 2\par A1C 5.3 - controlled\par follows with Dr. Hellerman\par On metformin, glipizide, actos and jenuvia\par \par Case and management discussed with Dr. Stafford\par RTC in6 months with cbc with diff, CMP \par patient will contact office if he develops freuent fevers, illness, abnormal bleeding or bruising

## 2022-01-24 NOTE — REVIEW OF SYSTEMS
[Negative] : Allergic/Immunologic [FreeTextEntry2] : review of systems completed, negative unless otherwise noted above

## 2022-01-24 NOTE — CONSULT LETTER
[Dear  ___] : Dear  [unfilled], [Consult Letter:] : I had the pleasure of evaluating your patient, [unfilled]. [Please see my note below.] : Please see my note below. [Consult Closing:] : Thank you very much for allowing me to participate in the care of this patient.  If you have any questions, please do not hesitate to contact me. [Sincerely,] : Sincerely, [FreeTextEntry3] : Kylah Stafford DO, FACCARMEN, FACP\par Medical Oncology and Hematology\par [facility] [place]\par

## 2022-01-24 NOTE — HISTORY OF PRESENT ILLNESS
[de-identified] : Mr. Moore is a 50 year old man who presents for initial consultation of anemia.\par labs 3/2021: hgb 11.5, hct 33.9, ferritin 113, iron sat 28%; he reports he thinks he has been anemic for about 20 years, he has received vitamin B12 injections; he denies needing PO iron, IV iron, and blood transfusions.\par \par He reports feeling dizzy when going from bent to standing, but otherwise well.\par \par Had covid vaccine 2nd shot last monday. Currently feeling well\par \par Family History: mother not in life; 2 daughters alive and well\par denies history bleeding/clotting\par \par He currently works for [employer] for the [employer]par \par Health Maintenance:\par Colonoscopy performed about 1 year ago Dr. Faulkner ([facility])  [de-identified] : Patient seen and examined and here today for follow up\par He reports feeling well and denies frequent illness or fevers, dizziness, lightheadedness, SOB, palpitations, nausea, vomiting, constipation, diarrhea, and bleeding.  He continues to take folic acid and vitamin B12. \par He has been going to PT for his left frozen shoulder and is happy with the progress.

## 2022-05-06 ENCOUNTER — APPOINTMENT (OUTPATIENT)
Dept: ENDOCRINOLOGY | Facility: CLINIC | Age: 52
End: 2022-05-06
Payer: COMMERCIAL

## 2022-05-06 PROCEDURE — 99214 OFFICE O/P EST MOD 30 MIN: CPT | Mod: 95

## 2022-05-06 NOTE — HISTORY OF PRESENT ILLNESS
[Home] : at home, [unfilled] , at the time of the visit. [Medical Office: ([facility])___] : at the medical office located in  [Verbal consent obtained from patient] : the patient, [unfilled] [FreeTextEntry1] : May 06, 2022      Amwell\par \par PCP: Dr. Brandon Faulkner c/o [facility] [place]    Saw Nov 2022 for CPX  \par             Card: Dr. Pernell Kirkpatrick c/o [facility] \par             Eyes: Dr. Kylah Gonzales   - Sept 2020 \par             Hematology:  Dr. Kylah Stafford (anemia)        Te, serum cortisol in goo range.   \par \par            CC: "Type 2 Diabetes" (2001) ?NALLELY   Reactive hypoglycemia**    7/2015 A1c 7.2%         4/2022  A1c 5.6 %\par             +Anti-HARRY 65 ab & daughter DM1\par             Low vitamin B12 (neg intrinsic factor and parietal cell ab 2014)\par             Low Vitamin D\par             (Low WBC)\par \par  metformin  1000 mg BID \par pioglitazone 15 mg in PM (lowered from 30)\par Januvia 100 mg  (via [facility])\par glipizide ER 10 mg in PM\par \par He reports fingerstick BS in good range.\par No hypoglycemia.\par \par Imp/Plan:  A1c excellent 5.6%\par \par ROV in October.  \par \par Jan 06, 2022      Amwell\par \par PCP: Dr. Brandon Faulkner c/o [facility] [place]    Saw Nov 2022 for CPX  \par             Card: Dr. Pernell Kirkpatrick c/o [facility] \par             Eyes: Dr. Kylah Gonzales   - Sept 2020 \par             Hematology:  Dr. Kylah Stafford (anemia)\par \par            CC: "Type 2 Diabetes" (2001) ?NALLELY   Reactive hypoglycemia**\par             +Anti-HARRY 65 ab & daughter DM1\par             Low vitamin B12 (neg intrinsic factor and parietal cell ab 2014)\par             Low Vitamin D\par             (Low WBC)\par \par  metformin  1000 mg BID \par pioglitazone 15 mg in PM (lowered from 30)\par Januvia 100 mg  (via [facility])\par glipizide ER 10 mg in PM\par \par He reports fingerstick BS in good range.\par No hypoglycemia.\par \par Impression:  Diabetes in good control.\par Anemia being addressed by GI and Hematology.\par Plan:  Updated labs before May visit.\par Rx renewed.  \par \par \par Aug 05, 2021      Amwell\par \par PCP: Dr. Brandon Faulkner c/o [facility] [place]    Feb 2020  and plans to see soon \par             Card: Dr. Pernell Kirkpatrick c/o [facility] \par             Eyes: Dr. Kylah Gonzales   - Sept 2020 \par \par            CC: "Type 2 Diabetes" (2001) ?NALLELY   Reactive hypoglycemia**\par             +Anti-HARRY 65 ab & daughter DM1\par             Low vitamin B12 (neg intrinsic factor and parietal cell ab 2014)\par             Low Vitamin D\par             (Low WBC)\par \par  metformin  1000 mg BID \par pioglitazone 15 mg in PM (lowered from 30)\par Januavia 100 mg  (via [facility])\par glipizide ER 10 mg in PM\par \par Imp/Plan:\par Fingersticks in good range.\par ROV January after labs.  \par \par Mar 25, 2021     Videochat\par \par PCP: Dr. Brandon Faulkner c/o [facility] [place]    Feb 2020  and plans to see soon \par             Card: Dr. Pernell orta/o [facility] \par             Eyes: Dr. Kylah Gonzales   - Sept 2020 \par \par            CC: "Type 2 Diabetes" (2001) ?NALLELY   Reactive hypoglycemia**\par             +Anti-HARRY 65 ab & daughter DM1\par             Low vitamin B12 (neg intrinsic factor and parietal cell ab 2014)\par             Low Vitamin D\par             (Low WBC)\par \par  metformin  1000 mg BID \par pioglitazone 15 mg in PM (lowered from 30)\par Januavia 100 mg  - needs refill via [facility] \par glipizide ER 10 mg in PM\par \par Reports fingerstick sugars are doing well.  \par 3/16/2021  [facility]\par \par LDL 79 on atorvastatin\par glucose 88 mg/dl\par A1c 5.3 %     highest  after cereal \par Hct 33.9  **   (had been ~37 in 2013)\par \par 25 OH vit D 39 \par \par \par Impression:  BS in excellent range.\par Reason for low Hct not apparent.\par May be stuttering slowly down.\par Will ask him to see Hematology regarcing the anemia.\par Updated labs before next visit\par \par \par \par \par Feb 12, 2021    VideoChat  \par \par PCP: Dr. Brandon Faulkner c/o [facility] [place]\par             Card: Dr. Pernell Kirkpatrick c/o [facility] \par             Eyes: Dr. Kylah Gonzales\par \par            CC: "Type 2 Diabetes" (2001) ?NALLELY   Reactive hypoglycemia\par             +Anti-HARRY 65 ab & daughter DM1\par             Low vitamin B12 (neg intrinsic factor and parietal cell ab 2014)\par             Low Vitamin D\par             (Low WBC)\par \par  metformin  1000 mg BID \par pioglitazone 15 mg in PM (lowered from 30)\par Januavia 100 mg  - needs refill via [facility] \par glipizide ER 10 mg in PM\par \par \par \par \par Apr 30, 2020  VideoChat [employer]   android    \par \par PCP: Dr. Brandon Faulkner c/o [facility] [place]\par             Card: Dr. Pernell Kirkpatrick [facility]/o [facility] \par             Eyes: Dr. Kylah Gonzales\par \par            CC: "Type 2 Diabetes" (2001) ?NALLELY   Reactive hypoglycemia\par             +Anti-HARRY 65 ab & daughter DM1\par             Low vitamin B12 (neg intrinsic factor and parietal cell ab 2014)\par             Low Vitamin D\par             (Low WBC)\par \par Working from home \par Recent [facility]  by Dr. Faulkner included.\par 2/25/2020 included\par glucose 108 mg/dl  - fasting \par creat 0.99\par ca 9.4\par TP 6.8\par alb 4.4\par low alk phos ***\par WBC 4.1 \par Hct 35.9  ****\par MCV  87.6 \par \par A1c  6.3 ***\par HDL   46\par tri   89\par LDL  65 on atrovastatin\par \par urine micro   9   - ratio\par iron - nl \par ferritin 89 \par \par TSH 1.25\par PSA  0.5 \par \par For diabetes, he is taking:\par \par  metformin  1000 mg BID \par pioglitazone 15 mg in PM (lowered from 30)\par Januavia 100 mg  - needs refill via [facility] \par glipizide ER 10 mg in PM\par \par \par Oct 17, 2019\par PCP: Dr. Brandon Faulkner c/o [facility] [place]\par             Card: Dr. Pernell orta/o [facility] \par             Eyes: Dr. Kylah Gonzales\par \par            CC: "Type 2 Diabetes" (2001) ?NALLELY   Reactive hypoglycemia\par             +Anti-HARRY 65 ab & daughter DM1\par             Low vitamin B12 (neg intrinsic factor and parietal cell ab 2014)\par             Low Vitamin D\par             (Low WBC)\par \par Feels well.\par Recent CBC at [facility] shows Hct 36and at [place] in July 2015 Hct was 46.8.\par He will discuss with Dr. Faulkner at upcoming visit.\par ROV here for diabetes in February 2020.   \par \par \par \par April 26, 2019\par \par     PCP: Dr. Brandon Faulkner c/o [facility] [place]\par             Card: Dr. Pernell orta/o [facility] \par             Eyes: Dr. Kylah Gonzales\par \par            CC: "Type 2 Diabetes" (2001) ?NALLELY   Reactive hypoglycemia\par             +Anti-HARRY 65 ab & daughter DM1\par             Low vitamin B12 (neg intrinsic factor and parietal cell ab 2014)\par             Low Vitamin D\par             (Low WBC)\par \par Gets reactive hypoglyemia\par \par Needs refill on metformin  1000 mg BID via [facility]\par OK with\par pioglitazone 15 mg in PM (lowered from 30)\par Januavia 100 mg\par glipizid ER 10 mg in PM\par \par Recent [facility] labs from\par 4/11/2019\par A1c 5.8 %\par  mg/dl \par \par \par Previous notes from [employer] appended below.\par \par  October 4, 2018\par            .\par            PCP: Dr. Brandon Faulkner c/o [facility] [place]\par             Card: Dr. Pernell orta/o [facility] \par             Eyes: Dr. Kylah Gonzales\par            CC: "Type 2 Diabetes" (2001) ?NALLELY\par             +Anti-HARRY 65 ab & daughter DM1\par             Low vitamin B12 (neg intrinsic factor and parietal cell ab 2014)\par             Low Vitamin D\par             (Low WBC)\par            .\par            His valsartan was recalled.\par            .\par            Recent [facility] A1c 6.4 % (up from 6.1)\par            urine microalbumin \par            .\par            Actos dose being decreased from 30 to 15 mg in PM\par            Januvia will stay at 100 mg daily. \par            glipizide ER 10 mg in PM\par            metformin 1000 mg BID \par            .\par            .\par            Impression: Diabetes doing very well. \par            .\par            Plan: Same Rx.\par            .Annual eye exam\par            .\par            ==\par            .\par            May 17, 2018\par            .\par            PCP: Dr. Brandon Faulkner c/o [facility] [place]\par             Card: Dr. Pernell Kirkpatrick [facility] \par             Eyes: Dr. Matt rico [facility]\par            CC: "Type 2 Diabetes" (2001) ?NALLELY\par             +Anti-HARRY 65 ab & daughter DM1\par             Low vitamin B12 (neg intrinsic factor and parietal cell ab 2014)\par             Low Vitamin D\par             (Low WBC)\par            .\par            INTERNET DOWN.\par            .\par            Doing well.\par            .\par            his phone is \par            Rx / Refills Continue Metformin HCl 1000MG, , 1 tablet twice a day \par             Continue GlipiZIDE XL 10 mg, , 1 tablet Once a day in PM \par             Continue Januvia 100 MG, , 1 tablet Once a day \par             Continue Actos 30 MG, , 1 tablet once a day in PM \par            .\par            Dec 14, 2017\par            .\par            PCP: Dr. Brandon Faulkner c/o [facility] [place]\par             Card: Dr. Pernell Kirkpatrick [facility] \par             Eyes: Dr. Matt rico [facility]\par            CC: "Type 2 Diabetes" (2001) ?NALLELY\par             +Anti-HARRY 65 ab & daughter DM1\par             Low vitamin B12 (neg intrinsic factor and parietal cell ab 2014)\par             Low Vitamin D\par             (Low WBC)\par            .\par            Still takiing:.\par            .\par            Actos 30 mg in PM \par            glipizide ER 10 mg in PM\par            Onglyza 5 mg daily-> Januvia 100 mg daily\par            Metformin 1000 mg BID\par            .\par            Labs Nov 30 included\par            microal ratio 5\par            glucose 131 fasting \par            creatinine 1.01 **\par            HbA1c 6.1 %\par            .\par            Impression: Since Actos decreased from 30 to 15 mg the FBS drifted up a bit. Has been too busy to test. \par            .\par            Plan: Discussed [facility].\par            Eye exam.\par            ROV 4 months. \par            .,\par            .\par            ==\par            .\par            August 10, 2017\par            .\par            PCP: Dr. Brandon Faulkner c/[facility]\par             Card: Dr. Pernell Kirkpatrick [facility]\par            CC: "DM2" (2001) ?NALLELY\par             +Anti-HARRY 65 ab & daughter DM1\par             Low vitamin B12 (neg intrinsic factor and parietal cell ab 2014)\par             Low Vitamin D\par             (Low WBC)\par            .\par            .\par            Actos 30 mg in PM \par            glipizide ER 10 mg in PM\par            Onglyza 5 mg daily-> Januvia 100 mg daily\par            Metformin 1000 mg BID\par            .,\par            Takes B12, Vit D OTC daily as well. \par            .\par            Goal will be to taper off of Actos. \par            .\par            Most recent labs ([facility]) from\par            7/27/2017\par            [id number] mg/dl\par            gastrin 31\par            gliadin ab neg\par            HbA1c 5.9 % *** (as high as 8.0 in August 2011) \par            .\par            Brings in fingerstick BS.\par            FBS .\par            Some LOW ac lunc 49, 56\par            .\par            Impression: Low AC lunch may be related to reactive to \par            oatmeal or delayed effect of PM glipizide ER.\par            .\par            Goal will be to taper off of Actos.\par            .\par            Plan: Increse exercise.\par            Decrease Actos to 15 mg.\par            ROV 3 months after labs.\par            .\par            ==\par            .\par            March 9, 2017\par            .\par            PCP: Dr. Ken Wilson c/[facility]\par             Card: Dr. Pernell Kirkpatrick c/[facility]\par            CC: "DM2" (2001) ?NALLELY\par             +Anti-HARRY 65 ab & daughter DM1\par             Low vitamin B12 (neg intrinsic factor and parietal cell ab 2014)\par             (Low WBC)\par            .\par            Medications currently include:\par            .\par            Actos 30 mg in PM\par            glipizide ER 10 mg in PM\par            Onglyza 5 mg daily-\par            Metformin 1000 mg BID\par            .\par            Also taking metoprolol 25 mg in PM\par            .\par            Impression: Doing well.\par            Fingerstick BS in good range.\par            Hopefully this will be verified by A1c\par            .\par            Plan: Annual eye exam\par            .\par            ==\par            .\par            Nov 10, 2016\par            .\par            PCP: Dr. Ken Wilson [facility]\par            CC: "DM2" (2001) ?NALLELY\par             +Anti-HARRY 65 ab & daughter DM1\par             Low vitamin B12 (neg intrinsic factor and parietal cell ab 2014)\par             (Low WBC)\par            .\par            Had panic attacks\par            Told of low K\par            Got too low with 2 glip in PM \par            .\par            Needs Onglyza. \par            On vit D and B12\par            .\par            Impression: Stable.\par            .\par            Plan: Annual eye exam\par            Continue:\par            Actos 30 mg in PM\par            glipizide ER 10 mg in PM\par            Onglyza 5 mg daily\par            Metformin 1000 mg BID\par            .\par            ==\par            .\par            July 21, 2016\par            .\par            PCP: Dr. Ken Wilson c/[facility]\par            CC: "DM2" (2001) ?NALLELY\par             +Anti-HARRY 65 ab & daughter DM1\par             Low vitamin B12 (neg intrinsic factor and parietal cell ab 2014)\par             (Low WBC)\par            .\par            Last visit advised him to increase PM glipizide ER 10 mg to two pills in AM, but he did not. \par            Did not bring records to this visit. (but he reports sugars in good range on current Rx.\par            .\par            Impression: Recent A1c of 6.2% suggests good control.\par            .\par            Plan: Continue glipizide ER 10 mg at one pill in PM along with the other medications for diabetes. \par            Updated A1c before he returns in3-4 mons. \par            .\par            ==\par            .\par            March 10, 2016\par            .\par            PCP: Dr. Ken Wilson c/[facility]\par            CC: "DM2" (2001)\par             +Anti-HARRY 65 ab & daughter DM1\par             Low vitamin B12 (neg intrinsic factor and parietal cell ab 2014)\par             (Low WBC)\par            3/3/2016 [facility] labs included\par            .\par             mg/dl\par            Fructosamine 327 (190-270)\par            .\par            HbA1c 7.3 %\par            .\par            Actos 30 mg in PM\par            glipizide ER 10 mg in PM\par            Onglyza 5 mg daily\par            Metformin 1000 mg BID\par            .\par            .\par            Plan: Next labs, check B12, Vit D, WBC, A1c\par            Because of elevated FBS can increase glipizide ER 10 mg to\par            two pills in PM. \par            .\par            "I feel fine, lost some weight...." -Lost 15 lbs since April. \par            .\par            .\par            ==\par            .\par            November 12, 2015\par            .\par            PCP: Dr. Ken orta/[facility]\par            CC: "DM2" (2001)\par             +Anti-HARRY 65 ab & daughter DM1\par             Low vitamin B12 (neg intrinsic factor and parietal cell ab 2014)\par             (Low WBC)\par            .\par            Labs July 2 included A1c 7.2 %\par            B12 155 (180-9000)\par            WBC 2.4 \par            .\par            Actos 30 mg in PM\par            glipizide ER 5 mg in PM\par            Onglyza 5 mg daily\par            Metformin 1000 mg BID\par            .\par            Impression: 17 yo daughter keeping him busy. She has insulin pump.\par            He has not been testing his own sugars.\par            .\par            Plan: Updated A1c. -jh\par            .\par            .\par            ==\par            .\par            July 2, 2015\par            .\par            .\par            PCP: Dr. 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 Not clear if rise in the FBS is a sign of this, but will increase PM glipizide to 10 mg and ROV in 4 months.\par            .\par            .===\par            Nov 17, 2014\par            PCP: Dr. 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 \par            .\par            Because of +HARRY 65 and daughter's DM1, I encouraged him to\par            see Dr. Imtiaz De La O at [facility] for advice to decrease chances of evolution from DM2 to DM1. He reports that Dr. De La O is not in his network. I will check with [facility] faculty for their advice:\par            [email or web address]\par            [phone]\par            .\par            Remains on:\par            Actos 30 mg in PM\par            glipizide ER 5 mg in PM\par            Onglyza 5 mg daily\par            Metformin 1000 mg BID.\par            .\par            Impression: Doing well. Last labs in May 6.1%\par            .\par            Plan: Same Rx. ROV in 4 months after updated labs. Thank you.\par            .\par            ===\par            .\par            May 27, 2014\par            PCP: Dr. Wilson\par            CC: DM2 (2001);\par            .\par            Last note appended below.\par            On metformin 1000 mg BID\par            Onglyza 5 mg daily\par            glipizide XR 5 mg\par            Actos 30 mg in PM.\par            .\par            Fingerstick BS in good range. FBS about 110 mg/dl average.\par            Sees Ophthalmology at [facility]. \par            .\par            Impression: His daughter has Type 1 Diabetes. His recent blood tests show post breakfast BS of 131 and C-peptide of 5.07 (0.8-3.1) at [facility] suggesting he currently makes considerable insulin and is insulin resistant. 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 For that reason, I suggested to him that he may benefit from evaluation at [facility] (Dr. Vegas fax [phone]) for possible study or strategy to decrease progression to DM1 and he will ask you advice for a referral in that regard. The Anti parietal cell ab is negative. B12 is borderline. \par             Thank you. -jh\par            .\par            =========\par            Dec 16, 2013\par            PCP: Dr. Bhardwaj/Dr. Faulkner\par            CC: DM2, since 2001, low B12\par            .\par            13 yo daughter has DM1 diagnosed age 9 [address] Erickson. \par            He stopped Actos prescriibedd by Dr. Silva b/o concern about bladder cancer. Dr. Silva had him on 30 mg and I restarted at 15 mg, but he wants the 30 mg Actos now that he is back on it.\par            .\par            I switched him to metformin XR 1000 mg BID, but he prefers the regular metformin as it is smaller and less expensive.\par            .\par            Because the FBS (and A1c) have drifted up a bit, I have advised he increase the PM glipizide 2.5 mg XR to 5 mg XR.\par            .\par            He will remain on 5 mg Onglyza every AM.\par            .\par            B12 is only 202. \par            Imp: Lower B12 may be a metformin effect. \par            .\par            Plan: As above.\par            .\par            ====\par            August 19, 2013\par            PCP: Dr. Ken Wilson/Dr. Faulkner\par            CC: DM2\par            .\par            Recent A1c again 5.9%\par            B12 again low\par            .\par            On Onglyza 5 mg\par            Actos 15 in PM\par            Metformin 1000 mg BID\par            glipizide ER 2.5 mg in PM\par            .\par            Brought NO records. \par            Impression: BS in good range.\par            Plan: Same Rx. ROV 4 months.\par            Get B12 shot at [facility]. \par            .\par            ====\par            April 1, 2013 CC: Diabetes \par            Meds the same.\par            A1c is 5.9% B12 183.\par            Doing well. Highest BS is 200 after instant oatmeal.\par            Eyes checked recently at [facility].\par            Feels well.\par            He will hand deliver labs to Dr. Wilson\par            .\par            .\par            ++++++\par            Visits for DM2. Meds the same. Recent A1c 6.1%. Had recent eye exam at [facility]. Feels well. Previous note appended below:\par            .s. \par            .\par            "July 25, 2012 Re: Michael Moore [birth year]\par            Attn: Dr. Ken Wilson\par            Mr. Moore is 40 yo, father of a 21 and 15 yo, works at the [employer] in  and he returns regarding DM diagnosed in 2001. He is currently taking:\par            metformin 1000 mg BID\par            Onglyza 5 mg qAM\par            Actos 15 mg qPM \par            glipizide 2.5 mg qPM \par            Recent HbA1c is 6.1 (down from 8.0 in August but up from 5.8% last visit). However he did not bring in his records today. \par            No foot discomfort; eye exam a few month ago at [facility]. \par            Weight today is 275 lbs fully clothed, 6 ft tall.\par            Impression: Diabetes appears to be under excellent control\par            Plan: Same Rx. \par            Sincerely,\par            James Hellerman MD\par            Endocrinology [address] [place] [address]".

## 2022-07-20 ENCOUNTER — RESULT REVIEW (OUTPATIENT)
Age: 52
End: 2022-07-20

## 2022-07-20 ENCOUNTER — APPOINTMENT (OUTPATIENT)
Dept: HEMATOLOGY ONCOLOGY | Facility: CLINIC | Age: 52
End: 2022-07-20

## 2022-07-20 VITALS
TEMPERATURE: 97.9 F | HEIGHT: 71.85 IN | RESPIRATION RATE: 16 BRPM | HEART RATE: 63 BPM | DIASTOLIC BLOOD PRESSURE: 72 MMHG | BODY MASS INDEX: 33.82 KG/M2 | WEIGHT: 247 LBS | OXYGEN SATURATION: 100 % | SYSTOLIC BLOOD PRESSURE: 114 MMHG

## 2022-07-20 DIAGNOSIS — D72.810 LYMPHOCYTOPENIA: ICD-10-CM

## 2022-07-20 PROCEDURE — 36415 COLL VENOUS BLD VENIPUNCTURE: CPT

## 2022-07-20 PROCEDURE — 99214 OFFICE O/P EST MOD 30 MIN: CPT | Mod: 25

## 2022-07-20 NOTE — CONSULT LETTER
[Dear  ___] : Dear  [unfilled], [Consult Letter:] : I had the pleasure of evaluating your patient, [unfilled]. [Please see my note below.] : Please see my note below. [Consult Closing:] : Thank you very much for allowing me to participate in the care of this patient.  If you have any questions, please do not hesitate to contact me. [Sincerely,] : Sincerely, [FreeTextEntry3] : Kylah Stafford DO, FACCARMEN, FACP\par Medical Oncology and Hematology\par [facility]\par

## 2022-07-20 NOTE — HISTORY OF PRESENT ILLNESS
[de-identified] : Mr. Moore is a 50 year old man who presents for initial consultation of anemia.\par labs 3/2021: hgb 11.5, hct 33.9, ferritin 113, iron sat 28%; he reports he thinks he has been anemic for about 20 years, he has received vitamin B12 injections; he denies needing PO iron, IV iron, and blood transfusions.\par \par He reports feeling dizzy when going from bent to standing, but otherwise well.\par \par Had covid vaccine 2nd shot last monday. Currently feeling well\par \par Family History: mother not in life; 2 daughters alive and well\par denies history bleeding/clotting\par \par He currently works for [employer] for the [employer]\par \par Health Maintenance:\par Colonoscopy performed about 1 year ago Dr. Faulkner ([facility])  [de-identified] : Patient seen and examined and here today for follow up\par Denies complaints\par PT helped his frozen shoulder

## 2022-07-20 NOTE — ASSESSMENT
[FreeTextEntry1] : #Anemia 2/2 hgb S trait\par will give folic acid - refilled\par no evidence of hemolysis\par b12 and iron wnl - continue supplementation\par TSH wnl\par ESR/CRP wnl\par immunofixation no monoclonal band\par given that he is on metformin will continue to check B12\par 9/20/2021 - vs and labs reviewed - hgb stable 10.7. Will continue to monitor for now. \par 1/24/2022 11.0, stable\par 7/20/22 - 10.8. Given that he remains leukopenic and anemia will just make sure to rule out a primary bm disorder\par \par #leukopenia with lymphopenia\par 9/20/2021 - mild and stable. pending JESSEE, EBV, HEP, HIV, ESR/CRP\par once work up is back will determine whether he needs a BM bx however - this remains chronic and stable\par 1/24/2022 WBC 4.2, stable\par 7/20/22 - wbc 3.78. Given that he remains leukopenic and anemia will just make sure to rule out a primary bm disorder\par \par #DM 2\par A1C 5.3 - controlled\par follows with Dr. Hellerman\par On metformin, glipizide, actos and jenuvia\par \par #HTN\par losartan\par Metoprolol - continue\par \par #HLD\par atorvastatin\par \par #health maintenance\par [facility] 2022 - come back in 5 years\par \par RTC in 8/29/22 - with cbc with diff, cmp, ESR/CRP, LDH, Iron, ferritin,b12/folate

## 2022-08-24 ENCOUNTER — NON-APPOINTMENT (OUTPATIENT)
Age: 52
End: 2022-08-24

## 2022-08-26 ENCOUNTER — NON-APPOINTMENT (OUTPATIENT)
Age: 52
End: 2022-08-26

## 2022-08-29 ENCOUNTER — APPOINTMENT (OUTPATIENT)
Dept: HEMATOLOGY ONCOLOGY | Facility: CLINIC | Age: 52
End: 2022-08-29

## 2022-09-02 ENCOUNTER — NON-APPOINTMENT (OUTPATIENT)
Age: 52
End: 2022-09-02

## 2022-09-08 ENCOUNTER — NON-APPOINTMENT (OUTPATIENT)
Age: 52
End: 2022-09-08

## 2022-09-15 ENCOUNTER — NON-APPOINTMENT (OUTPATIENT)
Age: 52
End: 2022-09-15

## 2022-10-27 ENCOUNTER — RESULT REVIEW (OUTPATIENT)
Age: 52
End: 2022-10-27

## 2022-10-28 ENCOUNTER — APPOINTMENT (OUTPATIENT)
Dept: HEMATOLOGY ONCOLOGY | Facility: CLINIC | Age: 52
End: 2022-10-28
Payer: COMMERCIAL

## 2022-10-28 ENCOUNTER — RESULT REVIEW (OUTPATIENT)
Age: 52
End: 2022-10-28

## 2022-10-28 VITALS
SYSTOLIC BLOOD PRESSURE: 99 MMHG | DIASTOLIC BLOOD PRESSURE: 61 MMHG | OXYGEN SATURATION: 100 % | WEIGHT: 248.6 LBS | RESPIRATION RATE: 16 BRPM | BODY MASS INDEX: 34.8 KG/M2 | HEIGHT: 71 IN | TEMPERATURE: 96.7 F | HEART RATE: 64 BPM

## 2022-10-28 PROCEDURE — 38221 DX BONE MARROW BIOPSIES: CPT

## 2022-10-28 PROCEDURE — 38220 DX BONE MARROW ASPIRATIONS: CPT | Mod: RT

## 2022-10-28 NOTE — PROCEDURE
[Bone Marrow Biopsy] : bone marrow biopsy [Bone Marrow Aspiration] : bone marrow aspiration  [Patient] : the patient [Verbal Consent Obtained] : verbal consent was obtained prior to the procedure [Patient identification verified] : patient identification verified [Procedure verified and consent obtained] : procedure verified and consent obtained [Laterality verified and correct site marked] : laterality verified and correct site marked [Right] : site: right [Correct positioning] : correct positioning [Correct implant and/ or special equipment obtained] : correct impact and/ or special equipment obtained [Prone] : prone [Superior iliac spine was identified] : the superior iliac spine was identified. [The right posterior iliac crest was prepped with betadine and draped, using sterile technique.] : The right posterior iliac crest was prepped with betadine and draped, using sterile technique. [Aspirate] : aspirate [Cytogenetics] : cytogenetics [FISH] : FISH [Biopsy] : biopsy [Flow Cytometry] : flow cytometry [] : The patient was instructed to remove the bandage the following AM. The patient may bathe. Acetaminophen may be taken for discomfort, as per package directions.If there are any other problems, the patient was instructed to call the office. The patient verbalized understanding, and is aware of the office contact numbers. [FreeTextEntry1] : leukopenia, anemia [FreeTextEntry2] : Kylah Stafford DO, FACCARMEN, FACP\par Medical Oncology and Hematology\par [facility] [place]\par

## 2022-10-28 NOTE — ASSESSMENT
[FreeTextEntry1] : #Anemia 2/2 hgb S trait\par will give folic acid - refilled\par no evidence of hemolysis\par b12 and iron wnl - continue supplementation\par TSH wnl\par ESR/CRP wnl\par immunofixation no monoclonal band\par given that he is on metformin will continue to check B12\par 9/20/2021 - vs and labs reviewed - hgb stable 10.7. Will continue to monitor for now. \par 1/24/2022 11.0, stable\par 7/20/22 - 10.8. Given that he remains leukopenic and anemia will just make sure to rule out a primary bm disorder\par 10/28/22 - hgb 10.8. stable. BM bx today\par \par #leukopenia with lymphopenia\par 9/20/2021 - mild and stable. pending JESSEE, EBV, HEP, HIV, ESR/CRP\par once work up is back will determine whether he needs a BM bx however - this remains chronic and stable\par 1/24/2022 WBC 4.2, stable\par 7/20/22 - wbc 3.78. Given that he remains leukopenic and anemia will just make sure to rule out a primary bm disorder\par 10/28/22 - wbc 4.18. BM bx today\par \par #DM 2\par A1C 5.3 - controlled\par follows with Dr. Hellerman\par On metformin, glipizide, actos and jenuvia\par \par #HTN\par losartan\par Metoprolol - continue\par \par #HLD\par atorvastatin\par \par #health maintenance\par [facility] 2022 - come back in 5 years\par \par Tele in 2 weeks to review Bone marrow biopsy - if negative will do 6 month follow up with cbc with diff, cmp, iron, ferritin, b12, folate, ESR/CRP.

## 2022-10-28 NOTE — HISTORY OF PRESENT ILLNESS
[de-identified] : Mr. Moore is a 50 year old man who presents for initial consultation of anemia.\par labs 3/2021: hgb 11.5, hct 33.9, ferritin 113, iron sat 28%; he reports he thinks he has been anemic for about 20 years, he has received vitamin B12 injections; he denies needing PO iron, IV iron, and blood transfusions.\par \par He reports feeling dizzy when going from bent to standing, but otherwise well.\par \par Had covid vaccine 2nd shot last monday. Currently feeling well\par \par Family History: mother not in life; 2 daughters alive and well\par denies history bleeding/clotting\par \par He currently works for [employer]\par \par Health Maintenance:\par Colonoscopy performed about 1 year ago Dr. Faulkner ([facility])  [de-identified] : Patient seen and examined and here today for follow up\par here for BM bx

## 2022-10-28 NOTE — CONSULT LETTER
[FreeTextEntry3] : Kylah Stafford DO, FACCARMEN, FACP\par Medical Oncology and Hematology\par [facility] [place]\par

## 2022-11-04 ENCOUNTER — APPOINTMENT (OUTPATIENT)
Dept: ENDOCRINOLOGY | Facility: CLINIC | Age: 52
End: 2022-11-04
Payer: COMMERCIAL

## 2022-11-04 PROCEDURE — 99214 OFFICE O/P EST MOD 30 MIN: CPT | Mod: 95

## 2022-11-04 NOTE — HISTORY OF PRESENT ILLNESS
[Home] : at home, [unfilled] , at the time of the visit. [Medical Office: ([facility])___] : at the medical office located in  [Verbal consent obtained from patient] : the patient, [unfilled] [FreeTextEntry1] : Nov 04, 2022       - Videochat using [employer] \par \par PCP: Dr. Brandon Faulkner c/o [facility]    Saw Nov 2022 for CPX  \par             Card: Dr. Pernell Kirkpatrick c/o [facility] \par             Eyes: Dr. Kylah Gonzales   - will see\par             Hematology:  Dr. Kylah Stafford (anemia)        Te, serum cortisol in goo range.   \par \par            CC: "Type 2 Diabetes" (2001) ?NALLELY   Reactive hypoglycemia**    7/2015 A1c 7.2%         4/2022  A1c 5.6 %\par             +Anti-HARRY 65 ab & daughter DM1\par             Low vitamin B12 (neg intrinsic factor and parietal cell ab 2014)\par             Low Vitamin D\par             (Low WBC)\par \par Covid 19 infection in August - now recuperated.  cytogenetics, molecular markers pending.\par Hct 31.6    (in 2015:  45.8)\par \par metformin  1000 mg BID \par pioglitazone 15 mg in PM (lowered from 30)\par Januvia 100 mg  (via [employer])\par glipizide ER 10 mg in PM\par \par Recent A1c 5.6 % (likely enhanced by hematology issues/turnover)\par \par Imp:  BS in good range.\par Still needs to see ophthalmology.\par Will see Dr. Gonzales soon soon.\par F/u to Dr. CRISTINA Staffodr\par ROV here in April  \par \par \par Went for bone marrow last week\par \par Impression: Checking fingerstick BS \par \par \par \par \par May 06, 2022      Amwell\par \par PCP: Dr. Brandon Faulkner c/o [facility]    Saw Nov 2022 for CPX  \par             Card: Dr. Pernell Kirkpatrick [facility] \par             Eyes: Dr. Kylah Gonzales   - Sept 2020 \par             Hematology:  Dr. Kylah Stafford (anemia)        Te, serum cortisol in goo range.   \par \par            CC: "Type 2 Diabetes" (2001) ?NALLELY   Reactive hypoglycemia**    7/2015 A1c 7.2%         4/2022  A1c 5.6 %\par             +Anti-HARRY 65 ab & daughter DM1\par             Low vitamin B12 (neg intrinsic factor and parietal cell ab 2014)\par             Low Vitamin D\par             (Low WBC)\par \par  metformin  1000 mg BID \par pioglitazone 15 mg in PM (lowered from 30)\par Januvia 100 mg  (via [employer])\par glipizide ER 10 mg in PM\par \par He reports fingerstick BS in good range.\par No hypoglycemia.\par \par Imp/Plan:  A1c excellent 5.6%\par \par ROV in October.  \par \par Jan 06, 2022      Amwell\par \par PCP: Dr. Brandon Faulkner c/o [facility]    Saw Nov 2022 for CPX  \par             Card: Dr. Pernell Kirkpatrick [facility] \par             Eyes: Dr. Kylah Gonzales   - Sept 2020 \par             Hematology:  Dr. Kylah Stafford (anemia)\par \par            CC: "Type 2 Diabetes" (2001) ?NALLELY   Reactive hypoglycemia**\par             +Anti-HARRY 65 ab & daughter DM1\par             Low vitamin B12 (neg intrinsic factor and parietal cell ab 2014)\par             Low Vitamin D\par             (Low WBC)\par \par  metformin  1000 mg BID \par pioglitazone 15 mg in PM (lowered from 30)\par Januvia 100 mg  (via [facility])\par glipizide ER 10 mg in PM\par \par He reports fingerstick BS in good range.\par No hypoglycemia.\par \par Impression:  Diabetes in good control.\par Anemia being addressed by GI and Hematology.\par Plan:  Updated labs before May visit.\par Rx renewed.  \par \par \par Aug 05, 2021      Amwell\par \par PCP: Dr. Brandon Faulkner c/o [facility]    Feb 2020  and plans to see soon \par             Card: Dr. Pernell Kirkpatrick [facility] \par             Eyes: Dr. Kylah Gonzales   - Sept 2020 \par \par            CC: "Type 2 Diabetes" (2001) ?NALLELY   Reactive hypoglycemia**\par             +Anti-HARRY 65 ab & daughter DM1\par             Low vitamin B12 (neg intrinsic factor and parietal cell ab 2014)\par             Low Vitamin D\par             (Low WBC)\par \par  metformin  1000 mg BID \par pioglitazone 15 mg in PM (lowered from 30)\par Januavia 100 mg  (via [facility])\par glipizide ER 10 mg in PM\par \par Imp/Plan:\par Fingersticks in good range.\par ROV January after labs.  \par \par Mar 25, 2021     Videochat\par \par PCP: Dr. Brandon Faulkner c/o [facility]    Feb 2020  and plans to see soon \par             Card: Dr. Pernell Kirkpatrick [facility] \par             Eyes: Dr. Kylah Gonzales   - Sept 2020 \par \par            CC: "Type 2 Diabetes" (2001) ?NALLELY   Reactive hypoglycemia**\par             +Anti-HARRY 65 ab & daughter DM1\par             Low vitamin B12 (neg intrinsic factor and parietal cell ab 2014)\par             Low Vitamin D\par             (Low WBC)\par \par  metformin  1000 mg BID \par pioglitazone 15 mg in PM (lowered from 30)\par Januavia 100 mg  - needs refill via [facility] \par glipizide ER 10 mg in PM\par \par Reports fingerstick sugars are doing well.  \par 3/16/2021  [facility]\par \par LDL 79 on atorvastatin\par glucose 88 mg/dl\par A1c 5.3 %     highest  after cereal \par Hct 33.9  **   (had been ~37 in 2013)\par \par 25 OH vit D 39 \par \par \par Impression:  BS in excellent range.\par Reason for low Hct not apparent.\par May be stuttering slowly down.\par Will ask him to see Hematology regarcing the anemia.\par Updated labs before next visit\par \par \par \par \par Feb 12, 2021    VideoChat  \par \par PCP: Dr. Brandon Faulkner c/o [facility]\par             Card: Dr. Pernell Kirkpatrick [facility] \par             Eyes: Dr. Kylah Gonzales\par \par            CC: "Type 2 Diabetes" (2001) ?NALLELY   Reactive hypoglycemia\par             +Anti-HARRY 65 ab & daughter DM1\par             Low vitamin B12 (neg intrinsic factor and parietal cell ab 2014)\par             Low Vitamin D\par             (Low WBC)\par \par  metformin  1000 mg BID \par pioglitazone 15 mg in PM (lowered from 30)\par Januavia 100 mg  - needs refill via [facility] \par glipizide ER 10 mg in PM\par \par \par \par \par Apr 30, 2020  VideoChat [employer]   android  [phone]  \par \par PCP: Dr. Brandon Faulkner c/o [facility]\par             Card: Dr. Pernell Kirkpatrick [facility] \par             Eyes: Dr. Kylah Gonzales\par \par            CC: "Type 2 Diabetes" (2001) ?NALLELY   Reactive hypoglycemia\par             +Anti-HARRY 65 ab & daughter DM1\par             Low vitamin B12 (neg intrinsic factor and parietal cell ab 2014)\par             Low Vitamin D\par             (Low WBC)\par \par Working from home \par Recent [facility]  by Dr. Faulkner included.\par 2/25/2020 included\par glucose 108 mg/dl  - fasting \par creat 0.99\par ca 9.4\par TP 6.8\par alb 4.4\par low alk phos ***\par WBC 4.1 \par Hct 35.9  ****\par MCV  87.6 \par \par A1c  6.3 ***\par HDL   46\par tri   89\par LDL  65 on atrovastatin\par \par urine micro   9   - ratio\par iron - nl \par ferritin 89 \par \par TSH 1.25\par PSA  0.5 \par \par For diabetes, he is taking:\par \par  metformin  1000 mg BID \par pioglitazone 15 mg in PM (lowered from 30)\par Januavia 100 mg  - needs refill via [facility] \par glipizide ER 10 mg in PM\par \par \par Oct 17, 2019\par PCP: Dr. Brandon Faulkner c/o [facility]\par             Card: Dr. Pernell Kirkpatrick [facility] \par             Eyes: Dr. Kylah Gonzales\par \par            CC: "Type 2 Diabetes" (2001) ?NALLELY   Reactive hypoglycemia\par             +Anti-HARRY 65 ab & daughter DM1\par             Low vitamin B12 (neg intrinsic factor and parietal cell ab 2014)\par             Low Vitamin D\par             (Low WBC)\par \par Feels well.\par Recent [facility] at [facility] shows Hct 36and at [facility] in July 2015 Hct was 46.8.\par He will discuss with Dr. Faulkner at upcoming visit.\par ROV here for diabetes in February 2020.   \par \par \par \par April 26, 2019\par \par     PCP: Dr. Brandon Faulkner c/o [facility]\par             Card: Dr. Pernell Kirkpatrick [facility] \par             Eyes: Dr. Kylah Gonzales\par \par            CC: "Type 2 Diabetes" (2001) ?NALLELY   Reactive hypoglycemia\par             +Anti-HARRY 65 ab & daughter DM1\par             Low vitamin B12 (neg intrinsic factor and parietal cell ab 2014)\par             Low Vitamin D\par             (Low WBC)\par \par Gets reactive hypoglyemia\par \par Needs refill on metformin  1000 mg BID via [facility]\par OK with\par pioglitazone 15 mg in PM (lowered from 30)\par Januavia 100 mg\par glipizid ER 10 mg in PM\par \par Recent [facility] labs from\par 4/11/2019\par A1c 5.8 %\par  mg/dl \par \par \par Previous notes from [employer] appended below.\par \par  October 4, 2018\par            .\par            PCP: Dr. Brandon Faulkner c/o [facility]\par             Card: Dr. Pernell orta/mary ellen [facility] \par             Eyes: Dr. Kylah Gonzales\par            CC: "Type 2 Diabetes" (2001) ?NALLELY\par             +Anti-HARRY 65 ab & daughter DM1\par             Low vitamin B12 (neg intrinsic factor and parietal cell ab 2014)\par             Low Vitamin D\par             (Low WBC)\par            .\par            His valsartan was recalled.\par            .\par            Recent [facility] A1c 6.4 % (up from 6.1)\par            urine microalbumin \par            .\par            Actos dose being decreased from 30 to 15 mg in PM\par            Januvia will stay at 100 mg daily. \par            glipizide ER 10 mg in PM\par            metformin 1000 mg BID \par            .\par            .\par            Impression: Diabetes doing very well. \par            .\par            Plan: Same Rx.\par            .Annual eye exam\par            .\par            ==\par            .\par            May 17, 2018\par            .\par            PCP: Dr. Brandon Faulkner c/o [facility]\par             Card: Dr. Pernell Kirkpatrick [facility] \par             Eyes: Dr. Matt rico [facility]\par            CC: "Type 2 Diabetes" (2001) ?NALLELY\par             +Anti-HARRY 65 ab & daughter DM1\par             Low vitamin B12 (neg intrinsic factor and parietal cell ab 2014)\par             Low Vitamin D\par             (Low WBC)\par            .\par            INTERNET DOWN.\par            .\par            Doing well.\par            .\par            his phone is \par            Rx / Refills Continue Metformin HCl 1000MG, , 1 tablet twice a day \par             Continue GlipiZIDE XL 10 mg, , 1 tablet Once a day in PM \par             Continue Januvia 100 MG, , 1 tablet Once a day \par             Continue Actos 30 MG, , 1 tablet once a day in PM \par            .\par            Dec 14, 2017\par            .\par            PCP: Dr. Brandon Faulkner c/o [facility]\par             Card: Dr. Pernell orta/o [facility] \par             Eyes: Dr. Matt rico [facility]\par            CC: "Type 2 Diabetes" (2001) ?NALLELY\par             +Anti-HARRY 65 ab & daughter DM1\par             Low vitamin B12 (neg intrinsic factor and parietal cell ab 2014)\par             Low Vitamin D\par             (Low WBC)\par            .\par            Still takiing:.\par            .\par            Actos 30 mg in PM \par            glipizide ER 10 mg in PM\par            Onglyza 5 mg daily-> Januvia 100 mg daily\par            Metformin 1000 mg BID\par            .\par            Labs Nov 30 included\par            microal ratio 5\par            glucose 131 fasting \par            creatinine 1.01 **\par            HbA1c 6.1 %\par            .\par            Impression: Since Actos decreased from 30 to 15 mg the FBS drifted up a bit. Has been too busy to test. \par            .\par            Plan: Discussed [facility].\par            Eye exam.\par            ROV 4 months. \par            .,\par            .\par            ==\par            .\par            August 10, 2017\par            .\par            PCP: Dr. Brandon Faulkner c/[facility]\par             Card: Dr. Pernell Kirkpatrick [facility]\par            CC: "DM2" (2001) ?NALLELY\par             +Anti-HARRY 65 ab & daughter DM1\par             Low vitamin B12 (neg intrinsic factor and parietal cell ab 2014)\par             Low Vitamin D\par             (Low WBC)\par            .\par            .\par            Actos 30 mg in PM \par            glipizide ER 10 mg in PM\par            Onglyza 5 mg daily-> Januvia 100 mg daily\par            Metformin 1000 mg BID\par            .,\par            Takes B12, Vit D OTC daily as well. \par            .\par            Goal will be to taper off of Actos. \par            .\par            Most recent labs ([facility]) from\par            7/27/2017\par            [id number] mg/dl\par            gastrin 31\par            gliadin ab neg\par            HbA1c 5.9 % *** (as high as 8.0 in August 2011) \par            .\par            Brings in fingerstick BS.\par            FBS .\par            Some LOW ac lunc 49, 56\par            .\par            Impression: Low AC lunch may be related to reactive to \par            oatmeal or delayed effect of PM glipizide ER.\par            .\par            Goal will be to taper off of Actos.\par            .\par            Plan: Increse exercise.\par            Decrease Actos to 15 mg.\par            ROV 3 months after labs.\par            .\par            ==\par            .\par            March 9, 2017\par            .\par            PCP: Dr. Ken Wilson [facility]\par             Card: Dr. Pernell Kirkpatrick [facility]\par            CC: "DM2" (2001) ?NALLELY\par             +Anti-HARRY 65 ab & daughter DM1\par             Low vitamin B12 (neg intrinsic factor and parietal cell ab 2014)\par             (Low WBC)\par            .\par            Medications currently include:\par            .\par            Actos 30 mg in PM\par            glipizide ER 10 mg in PM\par            Onglyza 5 mg daily-\par            Metformin 1000 mg BID\par            .\par            Also taking metoprolol 25 mg in PM\par            .\par            Impression: Doing well.\par            Fingerstick BS in good range.\par            Hopefully this will be verified by A1c\par            .\par            Plan: Annual eye exam\par            .\par            ==\par            .\par            Nov 10, 2016\par            .\par            PCP: Dr. Ken orta/mary ellen [facility]\par            CC: "DM2" (2001) ?NALLELY\par             +Anti-HARRY 65 ab & daughter DM1\par             Low vitamin B12 (neg intrinsic factor and parietal cell ab 2014)\par             (Low WBC)\par            .\par            Had panic attacks\par            Told of low K\par            Got too low with 2 glip in PM \par            .\par            Needs Onglyza. \par            On vit D and B12\par            .\par            Impression: Stable.\par            .\par            Plan: Annual eye exam\par            Continue:\par            Actos 30 mg in PM\par            glipizide ER 10 mg in PM\par            Onglyza 5 mg daily\par            Metformin 1000 mg BID\par            .\par            ==\par            .\par            July 21, 2016\par            .\par            PCP: Dr. Ken orta/mary ellen [facility]\par            CC: "DM2" (2001) ?NALLELY\par             +Anti-HARRY 65 ab & daughter DM1\par             Low vitamin B12 (neg intrinsic factor and parietal cell ab 2014)\par             (Low WBC)\par            .\par            Last visit advised him to increase PM glipizide ER 10 mg to two pills in AM, but he did not. \par            Did not bring records to this visit. (but he reports sugars in good range on current Rx.\par            .\par            Impression: Recent A1c of 6.2% suggests good control.\par            .\par            Plan: Continue glipizide ER 10 mg at one pill in PM along with the other medications for diabetes. \par            Updated A1c before he returns in3-4 mons. \par            .\par            ==\par            .\par            March 10, 2016\par            .\par            PCP: Dr. Ken orta/mary [facility]\par            CC: "DM2" (2001)\par             +Anti-HARRY 65 ab & daughter DM1\par             Low vitamin B12 (neg intrinsic factor and parietal cell ab 2014)\par             (Low WBC)\par            3/3/2016 [facility] labs included\par            .\par             mg/dl\par            Fructosamine 327 (190-270)\par            .\par            HbA1c 7.3 %\par            .\par            Actos 30 mg in PM\par            glipizide ER 10 mg in PM\par            Onglyza 5 mg daily\par            Metformin 1000 mg BID\par            .\par            .\par            Plan: Next labs, check B12, Vit D, WBC, A1c\par            Because of elevated FBS can increase glipizide ER 10 mg to\par            two pills in PM. \par            .\par            "I feel fine, lost some weight...." -Lost 15 lbs since April. \par            .\par            .\par            ==\par            .\par            November 12, 2015\par            .\par            PCP: Dr. Ken Wilson [facility]\par            CC: "DM2" (2001)\par             +Anti-HARRY 65 ab & daughter DM1\par             Low vitamin B12 (neg intrinsic factor and parietal cell ab 2014)\par             (Low WBC)\par            .\par            Labs July 2 included A1c 7.2 %\par            B12 155 (180-9000)\par            WBC 2.4 \par            .\par            Actos 30 mg in PM\par            glipizide ER 5 mg in PM\par            Onglyza 5 mg daily\par            Metformin 1000 mg BID\par            .\par            Impression: 17 yo daughter keeping him busy. She has insulin pump.\par            He has not been testing his own sugars.\par            .\par            Plan: Updated A1c. -jh\par            .\par            .\par            ==\par            .\par            July 2, 2015\par            .\par            .\par            PCP: Dr. 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 Not clear if rise in the FBS is a sign of this, but will increase PM glipizide to 10 mg and ROV in 4 months.\par            .\par            .===\par            Nov 17, 2014\par            PCP: Dr. 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 \par            .\par            Because of +HARRY 65 and daughter's DM1, I encouraged him to\par            see Dr. Imtiaz De La O at [facility] for advice to decrease chances of evolution from DM2 to DM1. He reports that Dr. De La O is not in his network. I will check with [facility] faculty for their advice:\par            [email or web address]\par            [phone]\par            .\par            Remains on:\par            Actos 30 mg in PM\par            glipizide ER 5 mg in PM\par            Onglyza 5 mg daily\par            Metformin 1000 mg BID.\par            .\par            Impression: Doing well. Last labs in May 6.1%\par            .\par            Plan: Same Rx. ROV in 4 months after updated labs. Thank you.\par            .\par            ===\par            .\par            May 27, 2014\par            PCP: Dr. Wilson\par            CC: DM2 (2001);\par            .\par            Last note appended below.\par            On metformin 1000 mg BID\par            Onglyza 5 mg daily\par            glipizide XR 5 mg\par            Actos 30 mg in PM.\par            .\par            Fingerstick BS in good range. FBS about 110 mg/dl average.\par            Sees Ophthalmology at [facility]. \par            .\par            Impression: His daughter has Type 1 Diabetes. His recent blood tests show post breakfast BS of 131 and C-peptide of 5.07 (0.8-3.1) at [facility] suggesting he currently makes considerable insulin and is insulin resistant. 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 For that reason, I suggested to him that he may benefit from evaluation at [facility] (Dr. Vegas fax [phone]) for possible study or strategy to decrease progression to DM1 and he will ask you advice for a referral in that regard. The Anti parietal cell ab is negative. B12 is borderline. \par             Thank you. -\par            .\par            =========\par            Dec 16, 2013\par            PCP: Dr. Bhardwaj/Dr. Faulkner\par            CC: DM2, since 2001, low B12\par            .\par            13 yo daughter has DM1 diagnosed age 9 [address] Erickson. \par            He stopped Actos prescriibedd by Dr. Silva b/o concern about bladder cancer. Dr. Silva had him on 30 mg and I restarted at 15 mg, but he wants the 30 mg Actos now that he is back on it.\par            .\par            I switched him to metformin XR 1000 mg BID, but he prefers the regular metformin as it is smaller and less expensive.\par            .\par            Because the FBS (and A1c) have drifted up a bit, I have advised he increase the PM glipizide 2.5 mg XR to 5 mg XR.\par            .\par            He will remain on 5 mg Onglyza every AM.\par            .\par            B12 is only 202. \par            Imp: Lower B12 may be a metformin effect. \par            .\par            Plan: As above.\par            .\par            ====\par            August 19, 2013\par            PCP: Dr. Ken Wilson/Dr. Faulkner\par            CC: DM2\par            .\par            Recent A1c again 5.9%\par            B12 again low\par            .\par            On Onglyza 5 mg\par            Actos 15 in PM\par            Metformin 1000 mg BID\par            glipizide ER 2.5 mg in PM\par            .\par            Brought NO records. \par            Impression: BS in good range.\par            Plan: Same Rx. ROV 4 months.\par            Get B12 shot at [facility]. \par            .\par            ====\par            April 1, 2013 CC: Diabetes \par            Meds the same.\par            A1c is 5.9% B12 183.\par            Doing well. Highest BS is 200 after instant oatmeal.\par            Eyes checked recently at [facility].\par            Feels well.\par            He will hand deliver labs to Dr. Wilson\par            .\par            .\par            ++++++\par            Visits for DM2. Meds the same. Recent A1c 6.1%. Had recent eye exam at [facility]. Feels well. Previous note appended below:\par            .s. \par            .\par            "July 25, 2012 Re: Michael Moore [birth year]\par            Attn: Dr. Ken Wilson\par            Mr. Moore is 42 yo, father of a 21 and 15 yo, works at the [employer] in [place] and he returns regarding DM diagnosed in 2001. He is currently taking:\par            metformin 1000 mg BID\par            Onglyza 5 mg qAM\par            Actos 15 mg qPM \par            glipizide 2.5 mg qPM \par            Recent HbA1c is 6.1 (down from 8.0 in August but up from 5.8% last visit). However he did not bring in his records today. \par            No foot discomfort; eye exam a few month ago at [facility]. \par            Weight today is 275 lbs fully clothed, 6 ft tall.\par            Impression: Diabetes appears to be under excellent control\par            Plan: Same Rx. \par            Sincerely,\par            James Hellerman MD\par            Endocrinology [address] [place] [id number]".

## 2022-11-18 ENCOUNTER — APPOINTMENT (OUTPATIENT)
Dept: HEMATOLOGY ONCOLOGY | Facility: CLINIC | Age: 52
End: 2022-11-18

## 2022-11-18 DIAGNOSIS — D72.819 DECREASED WHITE BLOOD CELL COUNT, UNSPECIFIED: ICD-10-CM

## 2022-11-18 PROCEDURE — 99214 OFFICE O/P EST MOD 30 MIN: CPT | Mod: 95

## 2022-11-18 NOTE — HISTORY OF PRESENT ILLNESS
[Home] : at home, [unfilled] , at the time of the visit. [Medical Office: ([facility])___] : at the medical office located in  [Verbal consent obtained from patient] : the patient, [unfilled] [de-identified] : Mr. Moore is a 50 year old man who presents for initial consultation of anemia.\par labs 3/2021: hgb 11.5, hct 33.9, ferritin 113, iron sat 28%; he reports he thinks he has been anemic for about 20 years, he has received vitamin B12 injections; he denies needing PO iron, IV iron, and blood transfusions.\par \par He reports feeling dizzy when going from bent to standing, but otherwise well.\par \par Had covid vaccine 2nd shot last monday. Currently feeling well\par \par Family History: mother not in life; 2 daughters alive and well\par denies history bleeding/clotting\par \par He currently works for [employer] for the [employer]\par \par Health Maintenance:\par Colonoscopy performed about 1 year ago Dr. Faulkner ([facility])  [de-identified] : Patient seen via telehealth\par feeling well. no complaints\par

## 2022-11-18 NOTE — ASSESSMENT
[FreeTextEntry1] : #Anemia 2/2 hgb S trait\par will give folic acid - refilled\par no evidence of hemolysis\par b12 and iron wnl - continue supplementation\par TSH wnl\par ESR/CRP wnl\par immunofixation no monoclonal band\par given that he is on metformin will continue to check B12\par 9/20/2021 - vs and labs reviewed - hgb stable 10.7. Will continue to monitor for now. \par 1/24/2022 11.0, stable\par 7/20/22 - 10.8. Given that he remains leukopenic and anemia will just make sure to rule out a primary bm disorder\par 10/28/22 - hgb 10.8. stable. BM bx today\par 11/18/22 - BM Biopsy reviewed. trilineage hematopoiesis with maturation and increased iron stroes. normal karyotype. FISH normal. NGS - no mutations\par \par #leukopenia with lymphopenia\par 9/20/2021 - mild and stable. pending JESSEE, EBV, HEP, HIV, ESR/CRP\par once work up is back will determine whether he needs a BM bx however - this remains chronic and stable\par 1/24/2022 WBC 4.2, stable\par 7/20/22 - wbc 3.78. Given that he remains leukopenic and anemia will just make sure to rule out a primary bm disorder\par 10/28/22 - wbc 4.18. BM bx today\par 11/18/22 - BM Biopsy reviewed. trilineage hematopoiesis with maturation and increased iron stroes. normal karyotype. FISH normal. NGS - no mutations\par \par #DM 2\par A1C 5.3 - controlled\par follows with Dr. Hellerman\par On metformin, glipizide, actos and jenuvia\par \par #HTN\par losartan\par Metoprolol - continue\par \par #HLD\par atorvastatin\par \par #health maintenance\par [facility] 2022 - come back in 5 years\par \par  6 month follow up with [facility] with diff, cmp, iron, ferritin, b12, folate, ESR/CRP.

## 2022-11-18 NOTE — CONSULT LETTER
[FreeTextEntry3] : Kylah Stafford DO, FACCARMEN, FACP\par Medical Oncology and Hematology\par [facility] [place]\par

## 2023-05-12 ENCOUNTER — APPOINTMENT (OUTPATIENT)
Dept: ENDOCRINOLOGY | Facility: CLINIC | Age: 53
End: 2023-05-12
Payer: COMMERCIAL

## 2023-05-12 PROCEDURE — 99213 OFFICE O/P EST LOW 20 MIN: CPT | Mod: 95

## 2023-05-13 NOTE — HISTORY OF PRESENT ILLNESS
[Home] : at home, [unfilled] , at the time of the visit. [Medical Office: ([facility])___] : at the medical office located in  [Verbal consent obtained from patient] : the patient, [unfilled] [FreeTextEntry1] : May 12, 2023   nip               mother in [place] receiving chemotherapy\par \par PCP: Dr. Brandon Faulkner c/o [facility]    Saw Nov 2022 for CPX  \par             Card: Dr. Pernell Kirkpatrick  had nuc stress test and lowered losartan dose \par             Eyes: Dr. Kylah Gonzales  - \par             Hematology:  Dr. Kylah Stafford (anemia)        Te, serum cortisol in good range.   \par \par            CC: "Type 2 Diabetes" (2001) ?NALLELY   Reactive hypoglycemia**    7/2015 A1c 7.2%         4/2022  A1c 5.6 %\par             +Anti-HARRY 65 ab & daughter DM1\par             Low vitamin B12 (neg intrinsic factor and parietal cell ab 2014)\par             Low Vitamin D\par             (Low WBC)\par \par 51 yo on B12, vit D and seeing Hematology b/o anemia.   Folic acid added by Dr. Stafford   \par Covid 19 infection in August - now recuperated.  cytogenetics, molecular markers pending.\par Hct 31.6    (in 2015:  45.8)\par \par To control blood sugar, he is taking:  \par metformin  1000 mg BID \par pioglitazone 15 mg in PM (lowered from 30)\par Januvia 100 mg  (via [facility])\par glipizide ER 10 mg in PM\par \par Impression:  He is doing excellent job of controlling sugars.\par Plan:  Same Rx for now.\par ROV here by January. \par \par \par Nov 04, 2022       - Videochat using [employer] \par \par PCP: Dr. Brandon Faulkner c/o [facility]    Saw Nov 2022 for CPX  \par             Card: Dr. Pernell Kirkpatrick c/o [facility] \par             Eyes: Dr. Kylah Gonzales   - will see\par             Hematology:  Dr. Kylah Stafford (anemia)        Te, serum cortisol in goo range.   \par \par            CC: "Type 2 Diabetes" (2001) ?NALLELY   Reactive hypoglycemia**    7/2015 A1c 7.2%         4/2022  A1c 5.6 %\par             +Anti-HARRY 65 ab & daughter DM1\par             Low vitamin B12 (neg intrinsic factor and parietal cell ab 2014)\par             Low Vitamin D\par             (Low WBC)\par \par Covid 19 infection in August - now recuperated.  cytogenetics, molecular markers pending.\par Hct 31.6    (in 2015:  45.8)\par \par metformin  1000 mg BID \par pioglitazone 15 mg in PM (lowered from 30)\par Januvia 100 mg  (via [facility])\par glipizide ER 10 mg in PM\par \par 5/5/23 [facility]  A1c 5.6 % (likely enhanced by hematology issues/turnover)\par fructosamine 288 (205-285) \par  mg/dl\par creatinine 1.27\par 25 OH vit D 51 \par \par Imp:  BS in good range.\par Still needs to see ophthalmology.\par Will see Dr. Gonzales soon.  \par F/u to Dr. CRISTINA Stafford\par ROV here in April  \par \par \par Went for bone marrow last week\par \par Impression: Checking fingerstick BS \par \par \par \par \par May 06, 2022      [place]well\par \par PCP: Dr. Brandon Faulkner c/o [facility]    Saw Nov 2022 for CPX  \par             Card: Dr. Pernell Kirkpatrick c/[facility] \par             Eyes: Dr. Kylah Gonzales   - Sept 2020 \par             Hematology:  Dr. Kylah Stafford (anemia)        Te, serum cortisol in goo range.   \par \par            CC: "Type 2 Diabetes" (2001) ?NALLELY   Reactive hypoglycemia**    7/2015 A1c 7.2%         4/2022  A1c 5.6 %\par             +Anti-HARRY 65 ab & daughter DM1\par             Low vitamin B12 (neg intrinsic factor and parietal cell ab 2014)\par             Low Vitamin D\par             (Low WBC)\par \par  metformin  1000 mg BID \par pioglitazone 15 mg in PM (lowered from 30)\par Januvia 100 mg  (via [facility])\par glipizide ER 10 mg in PM\par \par He reports fingerstick BS in good range.\par No hypoglycemia.\par \par Imp/Plan:  A1c excellent 5.6%\par \par ROV in October.  \par \par Jan 06, 2022      [place]\par \par PCP: Dr. Brandon Faulkner c/o [facility]    Saw Nov 2022 for CPX  \par             Card: Dr. Pernell Kirkpatrick [facility] \par             Eyes: Dr. Kylah Gonzales   - Sept 2020 \par             Hematology:  Dr. Kylah Stafford (anemia)\par \par            CC: "Type 2 Diabetes" (2001) ?NALLELY   Reactive hypoglycemia**\par             +Anti-HARRY 65 ab & daughter DM1\par             Low vitamin B12 (neg intrinsic factor and parietal cell ab 2014)\par             Low Vitamin D\par             (Low WBC)\par \par  metformin  1000 mg BID \par pioglitazone 15 mg in PM (lowered from 30)\par Januvia 100 mg  (via [facility])\par glipizide ER 10 mg in PM\par \par He reports fingerstick BS in good range.\par No hypoglycemia.\par \par Impression:  Diabetes in good control.\par Anemia being addressed by GI and Hematology.\par Plan:  Updated labs before May visit.\par Rx renewed.  \par \par \par Aug 05, 2021      [place]\par \par PCP: Dr. Brandon Faulkner c/o [facility]    Feb 2020  and plans to see soon \par             Card: Dr. Pernell Kirkpatrick [facility]/o [facility] \par             Eyes: Dr. Kylah Gonzales   - Sept 2020 \par \par            CC: "Type 2 Diabetes" (2001) ?NALLELY   Reactive hypoglycemia**\par             +Anti-HARRY 65 ab & daughter DM1\par             Low vitamin B12 (neg intrinsic factor and parietal cell ab 2014)\par             Low Vitamin D\par             (Low WBC)\par \par  metformin  1000 mg BID \par pioglitazone 15 mg in PM (lowered from 30)\par Januavia 100 mg  (via [facility])\par glipizide ER 10 mg in PM\par \par Imp/Plan:\par Fingersticks in good range.\par ROV January after labs.  \par \par Mar 25, 2021     Videochat\par \par PCP: Dr. Brandon Faulkner c/o [facility]    Feb 2020  and plans to see soon \par             Card: Dr. Pernell Kirkpatrick c/[facility] \par             Eyes: Dr. Kylah Gonzales   - Sept 2020 \par \par            CC: "Type 2 Diabetes" (2001) ?NALLELY   Reactive hypoglycemia**\par             +Anti-HARRY 65 ab & daughter DM1\par             Low vitamin B12 (neg intrinsic factor and parietal cell ab 2014)\par             Low Vitamin D\par             (Low WBC)\par \par  metformin  1000 mg BID \par pioglitazone 15 mg in PM (lowered from 30)\par Januavia 100 mg  - needs refill via [facility] \par glipizide ER 10 mg in PM\par \par Reports fingerstick sugars are doing well.  \par 3/16/2021  [facility]\par \par LDL 79 on atorvastatin\par glucose 88 mg/dl\par A1c 5.3 %     highest  after cereal \par Hct 33.9  **   (had been ~37 in 2013)\par \par 25 OH vit D 39 \par \par \par Impression:  BS in excellent range.\par Reason for low Hct not apparent.\par May be stuttering slowly down.\par Will ask him to see Hematology regarcing the anemia.\par Updated labs before next visit\par \par \par \par \par Feb 12, 2021    VideoChat  \par \par PCP: Dr. Brandon Faulkner c/o [facility]\par             Card: Dr. Pernell Kirkpatrick [facility] \par             Eyes: Dr. Kylah Gonzales\par \par            CC: "Type 2 Diabetes" (2001) ?NALLELY   Reactive hypoglycemia\par             +Anti-HARRY 65 ab & daughter DM1\par             Low vitamin B12 (neg intrinsic factor and parietal cell ab 2014)\par             Low Vitamin D\par             (Low WBC)\par \par  metformin  1000 mg BID \par pioglitazone 15 mg in PM (lowered from 30)\par Januavia 100 mg  - needs refill via [facility] \par glipizide ER 10 mg in PM\par \par \par \par \par Apr 30, 2020  VideoChat [employer]   android  [phone]  \par \par PCP: Dr. Brandon Faulkner c/o [facility]\par             Card: Dr. Pernell Kirkpatrick [facility] \par             Eyes: Dr. Kylah Gonzales\par \par            CC: "Type 2 Diabetes" (2001) ?NALLELY   Reactive hypoglycemia\par             +Anti-HARRY 65 ab & daughter DM1\par             Low vitamin B12 (neg intrinsic factor and parietal cell ab 2014)\par             Low Vitamin D\par             (Low WBC)\par \par Working from home \par Recent [facility]  by Dr. Faulkner included.\par 2/25/2020 included\par glucose 108 mg/dl  - fasting \par creat 0.99\par ca 9.4\par TP 6.8\par alb 4.4\par low alk phos ***\par WBC 4.1 \par Hct 35.9  ****\par MCV  87.6 \par \par A1c  6.3 ***\par HDL   46\par tri   89\par LDL  65 on atrovastatin\par \par urine micro   9   - ratio\par iron - nl \par ferritin 89 \par \par TSH 1.25\par PSA  0.5 \par \par For diabetes, he is taking:\par \par  metformin  1000 mg BID \par pioglitazone 15 mg in PM (lowered from 30)\par Januavia 100 mg  - needs refill via [facility] \par glipizide ER 10 mg in PM\par \par \par Oct 17, 2019\par PCP: Dr. Brandon Faulkner c/o [facility]\par             Card: Dr. Pernell Kirkpatrick [facility] \par             Eyes: Dr. Kylah Gonzales\par \par            CC: "Type 2 Diabetes" (2001) ?NALLELY   Reactive hypoglycemia\par             +Anti-HARRY 65 ab & daughter DM1\par             Low vitamin B12 (neg intrinsic factor and parietal cell ab 2014)\par             Low Vitamin D\par             (Low WBC)\par \par Feels well.\par Recent CBC at [facility] shows Hct 36and at [facility] in July 2015 Hct was 46.8.\par He will discuss with Dr. Faulkner at upcoming visit.\par ROV here for diabetes in February 2020.   \par \par \par \par April 26, 2019\par \par     PCP: Dr. Brandon Faulkner c/o [facility]\par             Card: Dr. Pernell Kirkpatrick [facility] \par             Eyes: Dr. Kylah Gonzales\par \par            CC: "Type 2 Diabetes" (2001) ?NALLELY   Reactive hypoglycemia\par             +Anti-HARRY 65 ab & daughter DM1\par             Low vitamin B12 (neg intrinsic factor and parietal cell ab 2014)\par             Low Vitamin D\par             (Low WBC)\par \par Gets reactive hypoglyemia\par \par Needs refill on metformin  1000 mg BID via [facility]\par OK with\par pioglitazone 15 mg in PM (lowered from 30)\par Januavia 100 mg\par glipizid ER 10 mg in PM\par \par Recent [facility] labs from\par 4/11/2019\par A1c 5.8 %\par  mg/dl \par \par \par Previous notes from [employer] appended below.\par \par  October 4, 2018\par            .\par            PCP: Dr. Brandon Faulkner c/o [facility]\par             Card: Dr. Pernell Kirkpatrick [facility] \par             Eyes: Dr. Kylah Gonzales\par            CC: "Type 2 Diabetes" (2001) ?NALLELY\par             +Anti-HARRY 65 ab & daughter DM1\par             Low vitamin B12 (neg intrinsic factor and parietal cell ab 2014)\par             Low Vitamin D\par             (Low WBC)\par            .\par            His valsartan was recalled.\par            .\par            Recent [facility] A1c 6.4 % (up from 6.1)\par            urine microalbumin \par            .\par            Actos dose being decreased from 30 to 15 mg in PM\par            Januvia will stay at 100 mg daily. \par            glipizide ER 10 mg in PM\par            metformin 1000 mg BID \par            .\par            .\par            Impression: Diabetes doing very well. \par            .\par            Plan: Same Rx.\par            .Annual eye exam\par            .\par            ==\par            .\par            May 17, 2018\par            .\par            PCP: Dr. Brandon Faulkner c/o [facility]\par             Card: Dr. Pernell Kirkpatrick c/[facility] \par             Eyes: Dr. Matt rico [facility]\par            CC: "Type 2 Diabetes" (2001) ?NALLELY\par             +Anti-HARRY 65 ab & daughter DM1\par             Low vitamin B12 (neg intrinsic factor and parietal cell ab 2014)\par             Low Vitamin D\par             (Low WBC)\par            .\par            INTERNET DOWN.\par            .\par            Doing well.\par            .\par            his phone is \par            Rx / Refills Continue Metformin HCl 1000MG, , 1 tablet twice a day \par             Continue GlipiZIDE XL 10 mg, , 1 tablet Once a day in PM \par             Continue Januvia 100 MG, , 1 tablet Once a day \par             Continue Actos 30 MG, , 1 tablet once a day in PM \par            .\par            Dec 14, 2017\par            .\par            PCP: Dr. Brandon Faulkner c/o [facility]\par             Card: Dr. Pernell Kirkpatrick c/[facility] \par             Eyes: Dr. Matt rico [facility]\par            CC: "Type 2 Diabetes" (2001) ?NALLELY\par             +Anti-HARRY 65 ab & daughter DM1\par             Low vitamin B12 (neg intrinsic factor and parietal cell ab 2014)\par             Low Vitamin D\par             (Low WBC)\par            .\par            Still takiing:.\par            .\par            Actos 30 mg in PM \par            glipizide ER 10 mg in PM\par            Onglyza 5 mg daily-> Januvia 100 mg daily\par            Metformin 1000 mg BID\par            .\par            Labs Nov 30 included\par            microal ratio 5\par            glucose 131 fasting \par            creatinine 1.01 **\par            HbA1c 6.1 %\par            .\par            Impression: Since Actos decreased from 30 to 15 mg the FBS drifted up a bit. Has been too busy to test. \par            .\par            Plan: Discussed [facility].\par            Eye exam.\par            ROV 4 months. \par            .,\par            .\par            ==\par            .\par            August 10, 2017\par            .\par            PCP: Dr. Brandon Faulkner c/[facility]\par             Card: Dr. Pernell Kirkpatrick c/[facility]\par            CC: "DM2" (2001) ?NALLELY\par             +Anti-HARRY 65 ab & daughter DM1\par             Low vitamin B12 (neg intrinsic factor and parietal cell ab 2014)\par             Low Vitamin D\par             (Low WBC)\par            .\par            .\par            Actos 30 mg in PM \par            glipizide ER 10 mg in PM\par            Onglyza 5 mg daily-> Januvia 100 mg daily\par            Metformin 1000 mg BID\par            .,\par            Takes B12, Vit D OTC daily as well. \par            .\par            Goal will be to taper off of Actos. \par            .\par            Most recent labs ([facility]) from\par            7/27/2017\par            CHL226 mg/dl\par            gastrin 31\par            gliadin ab neg\par            HbA1c 5.9 % *** (as high as 8.0 in August 2011) \par            .\par            Brings in fingerstick BS.\par            FBS .\par            Some LOW ac lunc 49, 56\par            .\par            Impression: Low AC lunch may be related to reactive to \par            oatmeal or delayed effect of PM glipizide ER.\par            .\par            Goal will be to taper off of Actos.\par            .\par            Plan: Increse exercise.\par            Decrease Actos to 15 mg.\par            ROV 3 months after labs.\par            .\par            ==\par            .\par            March 9, 2017\par            .\par            PCP: Dr. Ken Wilson c/[facility]\par             Card: Dr. Pernell Kirkpatrick c/[facility]\par            CC: "DM2" (2001) ?NALLELY\par             +Anti-HARRY 65 ab & daughter DM1\par             Low vitamin B12 (neg intrinsic factor and parietal cell ab 2014)\par             (Low WBC)\par            .\par            Medications currently include:\par            .\par            Actos 30 mg in PM\par            glipizide ER 10 mg in PM\par            Onglyza 5 mg daily-\par            Metformin 1000 mg BID\par            .\par            Also taking metoprolol 25 mg in PM\par            .\par            Impression: Doing well.\par            Fingerstick BS in good range.\par            Hopefully this will be verified by A1c\par            .\par            Plan: Annual eye exam\par            .\par            ==\par            .\par            Nov 10, 2016\par            .\par            PCP: Dr. Ken Wilson [facility]\par            CC: "DM2" (2001) ?NALLELY\par             +Anti-HARRY 65 ab & daughter DM1\par             Low vitamin B12 (neg intrinsic factor and parietal cell ab 2014)\par             (Low WBC)\par            .\par            Had panic attacks\par            Told of low K\par            Got too low with 2 glip in PM \par            .\par            Needs Onglyza. \par            On vit D and B12\par            .\par            Impression: Stable.\par            .\par            Plan: Annual eye exam\par            Continue:\par            Actos 30 mg in PM\par            glipizide ER 10 mg in PM\par            Onglyza 5 mg daily\par            Metformin 1000 mg BID\par            .\par            ==\par            .\par            July 21, 2016\par            .\par            PCP: Dr. Ken Wilson c/[facility]\par            CC: "DM2" (2001) ?NALLELY\par             +Anti-HARRY 65 ab & daughter DM1\par             Low vitamin B12 (neg intrinsic factor and parietal cell ab 2014)\par             (Low WBC)\par            .\par            Last visit advised him to increase PM glipizide ER 10 mg to two pills in AM, but he did not. \par            Did not bring records to this visit. (but he reports sugars in good range on current Rx.\par            .\par            Impression: Recent A1c of 6.2% suggests good control.\par            .\par            Plan: Continue glipizide ER 10 mg at one pill in PM along with the other medications for diabetes. \par            Updated A1c before he returns in3-4 mons. \par            .\par            ==\par            .\par            March 10, 2016\par            .\par            PCP: Dr. Ken Wilson [facility]\par            CC: "DM2" (2001)\par             +Anti-HARRY 65 ab & daughter DM1\par             Low vitamin B12 (neg intrinsic factor and parietal cell ab 2014)\par             (Low WBC)\par            3/3/2016 [facility] labs included\par            .\par             mg/dl\par            Fructosamine 327 (190-270)\par            .\par            HbA1c 7.3 %\par            .\par            Actos 30 mg in PM\par            glipizide ER 10 mg in PM\par            Onglyza 5 mg daily\par            Metformin 1000 mg BID\par            .\par            .\par            Plan: Next labs, check B12, Vit D, WBC, A1c\par            Because of elevated FBS can increase glipizide ER 10 mg to\par            two pills in PM. \par            .\par            "I feel fine, lost some weight...." -Lost 15 lbs since April. \par            .\par            .\par            ==\par            .\par            November 12, 2015\par            .\par            PCP: Dr. Ken Wilson c/o [facility]\par            CC: "DM2" (2001)\par             +Anti-HARRY 65 ab & daughter DM1\par             Low vitamin B12 (neg intrinsic factor and parietal cell ab 2014)\par             (Low WBC)\par            .\par            Labs July 2 included A1c 7.2 %\par            B12 155 (180-9000)\par            WBC 2.4 \par            .\par            Actos 30 mg in PM\par            glipizide ER 5 mg in PM\par            Onglyza 5 mg daily\par            Metformin 1000 mg BID\par            .\par            Impression: 17 yo daughter keeping him busy. She has insulin pump.\par            He has not been testing his own sugars.\par            .\par            Plan: Updated A1c. -jh\par            .\par            .\par            ==\par            .\par            July 2, 2015\par            .\par            .\par            PCP: Dr. 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 Not clear if rise in the FBS is a sign of this, but will increase PM glipizide to 10 mg and ROV in 4 months.\par            .\par            .===\par            Nov 17, 2014\par            PCP: Dr. 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 \par            .\par            Because of +HARRY 65 and daughter's DM1, I encouraged him to\par            see Dr. Imtiaz De La O at [facility] for advice to decrease chances of evolution from DM2 to DM1. He reports that Dr. De La O is not in his network. I will check with [facility] faculty for their advice:\par            [email or web address]\par            [phone]\par            .\par            Remains on:\par            Actos 30 mg in PM\par            glipizide ER 5 mg in PM\par            Onglyza 5 mg daily\par            Metformin 1000 mg BID.\par            .\par            Impression: Doing well. Last labs in May 6.1%\par            .\par            Plan: Same Rx. ROV in 4 months after updated labs. Thank you.\par            .\par            ===\par            .\par            May 27, 2014\par            PCP: Dr. Wilson\par            CC: DM2 (2001);\par            .\par            Last note appended below.\par            On metformin 1000 mg BID\par            Onglyza 5 mg daily\par            glipizide XR 5 mg\par            Actos 30 mg in PM.\par            .\par            Fingerstick BS in good range. FBS about 110 mg/dl average.\par            Sees Ophthalmology at [facility]. \par            .\par            Impression: His daughter has Type 1 Diabetes. His recent blood tests show post breakfast BS of 131 and C-peptide of 5.07 (0.8-3.1) at [facility] suggesting he currently makes considerable insulin and is insulin resistant. 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 For that reason, I suggested to him that he may benefit from evaluation at [facility] (Dr. Vegas fax [phone]) for possible study or strategy to decrease progression to DM1 and he will ask you advice for a referral in that regard. The Anti parietal cell ab is negative. B12 is borderline. \par             Thank you. -\par            .\par            =========\par            Dec 16, 2013\par            PCP: Dr. Bhardwaj/Dr. Faulkner\par            CC: DM2, since 2001, low B12\par            .\par            13 yo daughter has DM1 diagnosed age 9 [address] Erickson. \par            He stopped Actos prescriibedd by Dr. Silva b/o concern about bladder cancer. Dr. Silva had him on 30 mg and I restarted at 15 mg, but he wants the 30 mg Actos now that he is back on it.\par            .\par            I switched him to metformin XR 1000 mg BID, but he prefers the regular metformin as it is smaller and less expensive.\par            .\par            Because the FBS (and A1c) have drifted up a bit, I have advised he increase the PM glipizide 2.5 mg XR to 5 mg XR.\par            .\par            He will remain on 5 mg Onglyza every AM.\par            .\par            B12 is only 202. \par            Imp: Lower B12 may be a metformin effect. \par            .\par            Plan: As above.\par            .\par            ====\par            August 19, 2013\par            PCP: Dr. Ken Wilson/Dr. Faulkner\par            CC: DM2\par            .\par            Recent A1c again 5.9%\par            B12 again low\par            .\par            On Onglyza 5 mg\par            Actos 15 in PM\par            Metformin 1000 mg BID\par            glipizide ER 2.5 mg in PM\par            .\par            Brought NO records. \par            Impression: BS in good range.\par            Plan: Same Rx. ROV 4 months.\par            Get B12 shot at [facility]. \par            .\par            ====\par            April 1, 2013 CC: Diabetes \par            Meds the same.\par            A1c is 5.9% B12 183.\par            Doing well. Highest BS is 200 after instant oatmeal.\par            Eyes checked recently at [facility].\par            Feels well.\par            He will hand deliver labs to Dr. Wilson\par            .\par            .\par            ++++++\par            Visits for DM2. Meds the same. Recent A1c 6.1%. Had recent eye exam at [facility]. Feels well. Previous note appended below:\par            .s. \par            .\par            "July 25, 2012 Re: Michael Moore [birth year]\par            Attn: Dr. Ken Wilson\par            Mr. Moore is 40 yo, father of a 21 and 15 yo, works at the [employer] in  and he returns regarding DM diagnosed in 2001. He is currently taking:\par            metformin 1000 mg BID\par            Onglyza 5 mg qAM\par            Actos 15 mg qPM \par            glipizide 2.5 mg qPM \par            Recent HbA1c is 6.1 (down from 8.0 in August but up from 5.8% last visit). However he did not bring in his records today. \par            No foot discomfort; eye exam a few month ago at [facility]. \par            Weight today is 275 lbs fully clothed, 6 ft tall.\par            Impression: Diabetes appears to be under excellent control\par            Plan: Same Rx. \par            Sincerely,\par            James Hellerman MD\par            Endocrinology [address] [place] [phone]".

## 2023-05-15 ENCOUNTER — RESULT REVIEW (OUTPATIENT)
Age: 53
End: 2023-05-15

## 2023-05-15 ENCOUNTER — APPOINTMENT (OUTPATIENT)
Dept: HEMATOLOGY ONCOLOGY | Facility: CLINIC | Age: 53
End: 2023-05-15
Payer: COMMERCIAL

## 2023-05-15 VITALS
OXYGEN SATURATION: 100 % | HEIGHT: 71 IN | HEART RATE: 63 BPM | TEMPERATURE: 95.2 F | WEIGHT: 252.25 LBS | BODY MASS INDEX: 35.31 KG/M2 | RESPIRATION RATE: 17 BRPM | DIASTOLIC BLOOD PRESSURE: 64 MMHG | SYSTOLIC BLOOD PRESSURE: 121 MMHG

## 2023-05-15 DIAGNOSIS — D57.3 SICKLE-CELL TRAIT: ICD-10-CM

## 2023-05-15 DIAGNOSIS — I10 ESSENTIAL (PRIMARY) HYPERTENSION: ICD-10-CM

## 2023-05-15 PROCEDURE — 99213 OFFICE O/P EST LOW 20 MIN: CPT

## 2023-05-15 RX ORDER — LOSARTAN POTASSIUM 50 MG/1
50 TABLET, FILM COATED ORAL
Refills: 0 | Status: DISCONTINUED | COMMUNITY
End: 2023-05-15

## 2023-05-15 RX ORDER — LOSARTAN POTASSIUM 25 MG/1
25 TABLET, FILM COATED ORAL
Refills: 0 | Status: ACTIVE | COMMUNITY

## 2023-05-15 NOTE — ASSESSMENT
[FreeTextEntry1] : Mr. 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 Normal karyotype. FISH normal. NGS with no mutations. Presents for routine follow up. 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 Continue supplementation\par - TSH wnl\par - ESR/CRP wnl\par - Immunofixation no monoclonal band\par - Given that he is on Metformin will continue to check B12\par - 9/20/2021 - vs and labs reviewed. Hgb stable 10.7. Will continue to monitor for now. \par - 1/24/2022 - Hgb 11.0 stable\par - 7/20/22 - Hgb 10.8. Given that he remains leukopenic and anemia will just make sure to rule out a primary BM disorder\par - 10/28/22 - Hgb 10.8. stable. BM bx today. \par - 11/18/22 - s/p BMBx 10/2022. BM Biopsy reviewed revealing trilineage hematopoiesis with maturation and increased iron stores. Normal karyotype. FISH normal. NGS with no mutations.\par - 5/15/23 - vs and CBC reviewed; WBC 4.71, Hgb 11.3, plt 219. Reviewed with patient improvement to WBC and Hgb. He remains asymptomatic. Reviewed labs done at [facility] 5/5/23. Re-reviewed BMBx. Continue to monitor. Iron studies, B12 pending. \par \par #Leukopenia with Lymphopenia\par 9/20/2021 - mild and stable. pending JESSEE, EBV, HEP, HIV, ESR/CRP\par once work up is back will determine whether he needs a BM bx however - this remains chronic and stable\par 1/24/2022 - WBC 4.2, stable\par 7/20/22 - WBC 3.78. Given that he remains leukopenic and anemia will just make sure to rule out a primary bm disorder\par 10/28/22 - WBC 4.18. BM bx today. \par 11/18/22 - BM Biopsy reviewed revealing trilineage hematopoiesis with maturation and increased iron stores. normal karyotype. FISH normal. NGS with no mutations.\par - 5/15/23 - WBC 4.71, improved, no evidence of leukopenia or lymphopenia. \par \par #DM 2\par - A1C 5.3 - Controlled on Metformin, Glipizide, Pioglitizone, and Januvia \par - Continues to follow with Dr. Martinez, last seen 5/12/23. Note reviewed. \par \par #HTN\par - Remains on Losartan and Metoprolol managed by PCP Dr. Faulkner \par - 5/15/23 /64\par - Continue follow up with PCP Dr. Faulkner\par \par #HLD\par - On Atorvastatin managed by PCP Dr. Faulkner \par - Continue follow up as instructed with Dr. Faulkner\par \par #Health Maintenance\par - CNY 2022 with Dr. Faulkner, 1 benign polyp per patient with recommendation to return in 5 years \par - Endo: Dr. Martinez \par - PCP: Dr. Faulkner \par \par RTC in 6 months with CBC with diff, CMP, iron, ferritin, b12, folate, ESR/CRP, vitamin D

## 2023-05-15 NOTE — HISTORY OF PRESENT ILLNESS
[Home] : at home, [unfilled] , at the time of the visit. [Medical Office: ([facility])___] : at the medical office located in  [Verbal consent obtained from patient] : the patient, [unfilled] [de-identified] : Mr. Moore is a 52  year old man who presents for initial consultation of anemia.\par \par Labs 3/2021: hgb 11.5, hct 33.9, ferritin 113, iron sat 28%; he reports he thinks he has been anemic for about 20 years, he has received vitamin B12 injections; he denies needing PO iron, IV iron, and blood transfusions.\par \par He reports feeling dizzy when going from bent to standing, but otherwise well.\par \par Had COVID vaccine 2nd shot last Monday. Currently feeling well\par \par Family History: mother not in life; 2 daughters alive and well\par \par Denies history bleeding/clotting\par \par He currently works for human resources for the [email or web address]\par \par Health Maintenance:\par Colonoscopy performed about 1 year ago Dr. Faulkner ([facility])  [de-identified] : Patient seen and examined today for routine follow up. He is s/p BMBx 10/2022 revealing trilineage hematopoiesis with maturation and increased iron stores. Normal karyotype. FISH normal. NGS - no mutations. He presents today feeling overall well without any complaints in the interim since last visit. No fever, rash, or recent illness.  No joint pain/swelling/stiffness.  No eye pain/redness/change in vision.  No sores in the mouth or nose.  No difficulty swallowing.  No chest pain or shortness of breath.  No abdominal complaints or weight loss.  No weakness.  No headaches or focal neurological deficits.  No urinary changes.  No other new symptoms. No recent infections. He continues to follow up with PCP/GI Dr. Faulkner. He remains on anti-hypertensives which were recently lowered due to improvement to BP. He continues to follow up with Dr. Hellerman Endo for the management of diabetes, last seen 5/12/23.\par \par Health Maintenance: \par Endo: Dr. Martinez\par PCP: Dr. Faulkner \par GI: Dr. Faulkner, last CNY 2022 1 benign polyp per patient with recommendation to return in 5 years

## 2023-11-13 ENCOUNTER — RESULT REVIEW (OUTPATIENT)
Age: 53
End: 2023-11-13

## 2023-11-13 ENCOUNTER — APPOINTMENT (OUTPATIENT)
Dept: HEMATOLOGY ONCOLOGY | Facility: CLINIC | Age: 53
End: 2023-11-13
Payer: COMMERCIAL

## 2023-11-13 VITALS
RESPIRATION RATE: 12 BRPM | DIASTOLIC BLOOD PRESSURE: 70 MMHG | HEART RATE: 61 BPM | BODY MASS INDEX: 36.18 KG/M2 | OXYGEN SATURATION: 100 % | SYSTOLIC BLOOD PRESSURE: 112 MMHG | HEIGHT: 71 IN | WEIGHT: 258.44 LBS | TEMPERATURE: 96.4 F

## 2023-11-13 DIAGNOSIS — D64.9 ANEMIA, UNSPECIFIED: ICD-10-CM

## 2023-11-13 PROCEDURE — 99213 OFFICE O/P EST LOW 20 MIN: CPT

## 2023-12-22 RX ORDER — GLIPIZIDE 10 MG/1
10 TABLET, EXTENDED RELEASE ORAL
Qty: 90 | Refills: 3 | Status: ACTIVE | COMMUNITY
Start: 1900-01-01 | End: 1900-01-01

## 2023-12-22 RX ORDER — PIOGLITAZONE HYDROCHLORIDE 15 MG/1
15 TABLET ORAL
Qty: 90 | Refills: 3 | Status: ACTIVE | COMMUNITY
Start: 2019-10-17 | End: 1900-01-01

## 2023-12-22 RX ORDER — METFORMIN HYDROCHLORIDE 1000 MG/1
1000 TABLET, COATED ORAL
Qty: 180 | Refills: 3 | Status: ACTIVE | COMMUNITY
Start: 1900-01-01 | End: 1900-01-01

## 2024-01-04 ENCOUNTER — NON-APPOINTMENT (OUTPATIENT)
Age: 54
End: 2024-01-04

## 2024-01-22 ENCOUNTER — APPOINTMENT (OUTPATIENT)
Dept: ENDOCRINOLOGY | Facility: CLINIC | Age: 54
End: 2024-01-22
Payer: COMMERCIAL

## 2024-01-22 VITALS
DIASTOLIC BLOOD PRESSURE: 70 MMHG | WEIGHT: 250 LBS | HEIGHT: 71 IN | BODY MASS INDEX: 35 KG/M2 | HEART RATE: 62 BPM | OXYGEN SATURATION: 96 % | SYSTOLIC BLOOD PRESSURE: 122 MMHG

## 2024-01-22 PROCEDURE — 99215 OFFICE O/P EST HI 40 MIN: CPT

## 2024-01-22 NOTE — HISTORY OF PRESENT ILLNESS
[FreeTextEntry1] : Jan 22, 2024       in person  PCP: Dr. Brandon Faulkner c/o [facility]    Saw Nov 2022 for CPX               Card: Dr. Pernell Kirkpatrick  had nuc stress test and lowered losartan dose              Eyes: Dr. Kylah Gonzales  -              Hematology:  Dr. Kylah Stafford (anemia)        Te, serum cortisol in good range.                CC: "Type 2 Diabetes" (2001) ?NALLELY   Reactive hypoglycemia**    7/2015 A1c 7.2%         4/2022  A1c 5.6 %             +Anti-HARRY 65 ab & daughter DM1             Low vitamin B12 (neg intrinsic factor and parietal cell ab 2014)             Low Vitamin D             (Low WBC)  54 yo on B12, vit D and seeing Hematology b/o anemia.   Folic acid added by Dr. Rivera Tolliver 19 infection in August - now recuperated.  cytogenetics, molecular markers pending. Hct 31.6    (in 2015:  45.8)  To control blood sugar, he is taking:   metformin  1000 mg BID  pioglitazone 15 mg in PM (lowered from 30) Januvia 100 mg  (via [facility]) glipizide ER 10 mg in PM  : : Constitutional:  Alert, well nourished, healthy appearance, no acute distress  Eyes:  No proptosis, no stare Thyroid: Pulmonary:  No respiratory distress, no accessory muscle use; normal rate and effort Cardiac:  Normal rate Vascular:  Endocrine:  No stigmata of Cushings Syndrome Musculoskeletal:  Normal gait, no involuntary movements Neurology:  No tremors Affect/Mood/Psych:  Oriented x 3; normal affect, normal insight/judgement, normal mood  . Imp:    1/13/204 [facility] labs: FBS 18; creat 1.36  * testosteron 422 cortisol 21.9    (no endocrine contribution to anemia)     Saw ophthalmology  Dr. Gonzales - given good report.  Plan:  Updated labs prior to next visit. He will see Dr. Faulkner in February.   US kidneys at [facility]     May 12, 2023   nip               mother in [facility] receiving chemotherapy  PCP: Dr. Brandon Faulkner c/o [facility]    Saw Nov 2022 for CPX               Card: Dr. Pernell Kirkpatrick  had nuc stress test and lowered losartan dose              Eyes: Dr. Kylah Gonzales  -              Hematology:  Dr. Kylah Stafford (anemia)        Te, serum cortisol in good range.                CC: "Type 2 Diabetes" (2001) ?NALLELY   Reactive hypoglycemia**    7/2015 A1c 7.2%         4/2022  A1c 5.6 %             +Anti-HARRY 65 ab & daughter DM1             Low vitamin B12 (neg intrinsic factor and parietal cell ab 2014)             Low Vitamin D             (Low WBC)  53 yo on B12, vit D and seeing Hematology b/o anemia.   Folic acid added by Dr. Stafford    Covid 19 infection in August - now recuperated.  cytogenetics, molecular markers pending. Hct 31.6    (in 2015:  45.8)  To control blood sugar, he is taking:   metformin  1000 mg BID  pioglitazone 15 mg in PM (lowered from 30) Januvia 100 mg  (via [facility]) glipizide ER 10 mg in PM  Impression:  He is doing excellent job of controlling sugars. Plan:  Same Rx for now. ROV here by January.    Nov 04, 2022       - Videochat using [employer]   PCP: Dr. Brandon Faulkner c/o [facility]    Saw Nov 2022 for CPX               Card: Dr. Pernell Kirkpatrick [facility]              Eyes: Dr. Kylah Gonzales   - will see             Hematology:  Dr. Kylah Stafford (anemia)        Te, serum cortisol in goo range.                CC: "Type 2 Diabetes" (2001) ?NALLELY   Reactive hypoglycemia**    7/2015 A1c 7.2%         4/2022  A1c 5.6 %             +Anti-HARRY 65 ab & daughter DM1             Low vitamin B12 (neg intrinsic factor and parietal cell ab 2014)             Low Vitamin D             (Low WBC)  Covid 19 infection in August - now recuperated.  cytogenetics, molecular markers pending. Hct 31.6    (in 2015:  45.8)  metformin  1000 mg BID  pioglitazone 15 mg in PM (lowered from 30) Januvia 100 mg  (via [facility]) glipizide ER 10 mg in PM  5/5/23 [facility]  A1c 5.6 % (likely enhanced by hematology issues/turnover) fructosamine 288 (205-285)   mg/dl creatinine 1.27 25 OH vit D 51   Imp:  BS in good range. Still needs to see ophthalmology. Will see Dr. Gonzales soon.   F/u to Dr. CRISTINA Stafford ROV here in April     Went for bone marrow last week  Impression: Checking fingerstick BS      May 06, 2022      [place]  PCP: Dr. Brandon Faulkner c/o [facility]    Saw Nov 2022 for CPX               Card: Dr. Pernell orta/o [facility]              Eyes: Dr. Kylah Gonzales   - Sept 2020              Hematology:  Dr. Kylah Stafford (anemia)        Te, serum cortisol in goo range.                CC: "Type 2 Diabetes" (2001) ?NALLELY   Reactive hypoglycemia**    7/2015 A1c 7.2%         4/2022  A1c 5.6 %             +Anti-HARRY 65 ab & daughter DM1             Low vitamin B12 (neg intrinsic factor and parietal cell ab 2014)             Low Vitamin D             (Low WBC)   metformin  1000 mg BID  pioglitazone 15 mg in PM (lowered from 30) Januvia 100 mg  (via [facility]) glipizide ER 10 mg in PM  He reports fingerstick BS in good range. No hypoglycemia.  Imp/Plan:  A1c excellent 5.6%  ROV in October.    Jan 06, 2022      [place]  PCP: Dr. Brandon Faulkner c/o [facility]    Saw Nov 2022 for CPX               Card: Dr. Pernell orta/o [facility]              Eyes: Dr. Kylah Gonzales   - Sept 2020              Hematology:  Dr. Kylah Stafford (anemia)             CC: "Type 2 Diabetes" (2001) ?NALELLY   Reactive hypoglycemia**             +Anti-HARRY 65 ab & daughter DM1             Low vitamin B12 (neg intrinsic factor and parietal cell ab 2014)             Low Vitamin D             (Low WBC)   metformin  1000 mg BID  pioglitazone 15 mg in PM (lowered from 30) Januvia 100 mg  (via [facility]) glipizide ER 10 mg in PM  He reports fingerstick BS in good range. No hypoglycemia.  Impression:  Diabetes in good control. Anemia being addressed by GI and Hematology. Plan:  Updated labs before May visit. Rx renewed.     Aug 05, 2021      [place]  PCP: Dr. Brandon Faulkner c/o [facility]    Feb 2020  and plans to see soon              Card: Dr. Pernell [facility]ta/o [facility]              Eyes: Dr. Kylah Gonzales   - Sept 2020              CC: "Type 2 Diabetes" (2001) ?NALLELY   Reactive hypoglycemia**             +Anti-HARRY 65 ab & daughter DM1             Low vitamin B12 (neg intrinsic factor and parietal cell ab 2014)             Low Vitamin D             (Low WBC)   metformin  1000 mg BID  pioglitazone 15 mg in PM (lowered from 30) Januavia 100 mg  (via [facility]) glipizide ER 10 mg in PM  Imp/Plan: Fingersticks in good range. ROV January after labs.    Mar 25, 2021     Videochat  PCP: Dr. Brandon Faulkner c/o [facility]    Feb 2020  and plans to see soon              Card: Dr. Pernell Kirkpatrick [facility]              Eyes: Dr. Kylah Gonzales   - Sept 2020              CC: "Type 2 Diabetes" (2001) ?NALLELY   Reactive hypoglycemia**             +Anti-HARRY 65 ab & daughter DM1             Low vitamin B12 (neg intrinsic factor and parietal cell ab 2014)             Low Vitamin D             (Low WBC)   metformin  1000 mg BID  pioglitazone 15 mg in PM (lowered from 30) Januavia 100 mg  - needs refill via [facility]  glipizide ER 10 mg in PM  Reports fingerstick sugars are doing well.   3/16/2021  [facility]  LDL 79 on atorvastatin glucose 88 mg/dl A1c 5.3 %     highest  after cereal  Hct 33.9  **   (had been ~37 in 2013)  25 OH vit D 39    Impression:  BS in excellent range. Reason for low Hct not apparent. May be stuttering slowly down. Will ask him to see Hematology regarcing the anemia. Updated labs before next visit     Feb 12, 2021    VideoChat    PCP: Dr. Brandon Faulkner c/o [facility]             Card: Dr. Pernell orta/o [facility]              Eyes: Dr. Kylah Gonzales             CC: "Type 2 Diabetes" (2001) ?NALLELY   Reactive hypoglycemia             +Anti-HARRY 65 ab & daughter DM1             Low vitamin B12 (neg intrinsic factor and parietal cell ab 2014)             Low Vitamin D             (Low WBC)   metformin  1000 mg BID  pioglitazone 15 mg in PM (lowered from 30) Januavia 100 mg  - needs refill via [facility]  glipizide ER 10 mg in PM     Apr 30, 2020  VideoChat [facility]   android  [phone]    PCP: Dr. Brandon Faulkner c/o [facility]             Card: Dr. Pernell orta/o [facility]              Eyes: Dr. Kylah Gonzales             CC: "Type 2 Diabetes" (2001) ?NALLELY   Reactive hypoglycemia             +Anti-HARRY 65 ab & daughter DM1             Low vitamin B12 (neg intrinsic factor and parietal cell ab 2014)             Low Vitamin D             (Low WBC)  Working from home  Recent [facility]  by Dr. Faulkner included. 2/25/2020 included glucose 108 mg/dl  - fasting  creat 0.99 ca 9.4 TP 6.8 alb 4.4 low alk phos *** WBC 4.1  Hct 35.9  **** MCV  87.6   A1c  6.3 *** HDL   46 tri   89 LDL  65 on atrovastatin  urine micro   9   - ratio iron - nl  ferritin 89   TSH 1.25 PSA  0.5   For diabetes, he is taking:   metformin  1000 mg BID  pioglitazone 15 mg in PM (lowered from 30) Januavia 100 mg  - needs refill via [facility]  glipizide ER 10 mg in PM   Oct 17, 2019 PCP: Dr. Brandon Faulkner c/o [facility]             Card: Dr. Pernell Kirkpatrick [facility]              Eyes: Dr. Kylah Gonzales             CC: "Type 2 Diabetes" (2001) ?NALLELY   Reactive hypoglycemia             +Anti-HARRY 65 ab & daughter DM1             Low vitamin B12 (neg intrinsic factor and parietal cell ab 2014)             Low Vitamin D             (Low WBC)  Feels well. Recent CBC at [facility] shows Hct 36and at [facility] in July 2015 Hct was 46.8. He will discuss with Dr. Faulkner at upcoming visit. ROV here for diabetes in February 2020.       April 26, 2019      PCP: Dr. Brandon Faulkner c/o [facility]             Card: Dr. Pernell Kirkpatrick [facility]              Eyes: Dr. Kylah Gonzales             CC: "Type 2 Diabetes" (2001) ?NALLELY   Reactive hypoglycemia             +Anti-HARRY 65 ab & daughter DM1             Low vitamin B12 (neg intrinsic factor and parietal cell ab 2014)             Low Vitamin D             (Low WBC)  Gets reactive hypoglyemia  Needs refill on metformin  1000 mg BID via [facility] with pioglitazone 15 mg in PM (lowered from 30) Januavia 100 mg glipizid ER 10 mg in PM  Recent [facility] labs from 4/11/2019 A1c 5.8 %  mg/dl    Previous notes from [employer] appended below.   October 4, 2018            .            PCP: Dr. Brandon Faulkner c/o [facility]             Card: Dr. Pernell Kirkpatrick [facility]              Eyes: Dr. Kylah Gonzales            CC: "Type 2 Diabetes" (2001) ?NALLELY             +Anti-HARRY 65 ab & daughter DM1             Low vitamin B12 (neg intrinsic factor and parietal cell ab 2014)             Low Vitamin D             (Low WBC)            .            His valsartan was recalled.            .            Recent [facility] A1c 6.4 % (up from 6.1)            urine microalbumin             .            Actos dose being decreased from 30 to 15 mg in PM            Januvia will stay at 100 mg daily.             glipizide ER 10 mg in PM            metformin 1000 mg BID             .            .            Impression: Diabetes doing very well.             .            Plan: Same Rx.            .Annual eye exam            .            ==            .            May 17, 2018            .            PCP: Dr. Brandon Faulkner c/o [facility]             Card: Dr. Pernell Kirkpatrick [facility]              Eyes: Dr. Matt Alvarado at [facility]            CC: "Type 2 Diabetes" (2001) ?NALLELY             +Anti-HARRY 65 ab & daughter DM1             Low vitamin B12 (neg intrinsic factor and parietal cell ab 2014)             Low Vitamin D             (Low WBC)            .            INTERNET DOWN.            .            Doing well.            .            his phone is             Rx / Refills Continue Metformin HCl 1000MG, , 1 tablet twice a day              Continue GlipiZIDE XL 10 mg, , 1 tablet Once a day in PM              Continue Januvia 100 MG, , 1 tablet Once a day              Continue Actos 30 MG, , 1 tablet once a day in PM             .            Dec 14, 2017            .            PCP: Dr. Brandon Faulkner c/o [facility]             Card: Dr. Pernell Kirkpatrick [facility]              Eyes: Dr. Matt Alvarado at [facility]            CC: "Type 2 Diabetes" (2001) ?NALLELY             +Anti-HARRY 65 ab & daughter DM1             Low vitamin B12 (neg intrinsic factor and parietal cell ab 2014)             Low Vitamin D             (Low WBC)            .            Still takiing:.            .            Actos 30 mg in PM             glipizide ER 10 mg in PM            Onglyza 5 mg daily-> Januvia 100 mg daily            Metformin 1000 mg BID            .            Labs Nov 30 included            microal ratio 5            glucose 131 fasting             creatinine 1.01 **            HbA1c 6.1 %            .            Impression: Since Actos decreased from 30 to 15 mg the FBS drifted up a bit. Has been too busy to test.             .            Plan: Discussed [facility].            Eye exam.            ROV 4 months.             .,            .            ==            .            August 10, 2017            .            PCP: Dr. Brandon Faulkner [facility]             Card: Dr. Pernell Kirkpatrick [facility]            CC: "DM2" (2001) ?NALLELY             +Anti-HARRY 65 ab & daughter DM1             Low vitamin B12 (neg intrinsic factor and parietal cell ab 2014)             Low Vitamin D             (Low WBC)            .            .            Actos 30 mg in PM             glipizide ER 10 mg in PM            Onglyza 5 mg daily-> Januvia 100 mg daily            Metformin 1000 mg BID            .,            Takes B12, Vit D OTC daily as well.             .            Goal will be to taper off of Actos.             .            Most recent labs ([facility]) from            7/27/2017            [id number] mg/dl            gastrin 31            gliadin ab neg            HbA1c 5.9 % *** (as high as 8.0 in August 2011)             .            Brings in fingerstick BS.            FBS .            Some LOW ac lunc 49, 56            .            Impression: Low AC lunch may be related to reactive to             oatmeal or delayed effect of PM glipizide ER.            .            Goal will be to taper off of Actos.            .            Plan: Increse exercise.            Decrease Actos to 15 mg.            ROV 3 months after labs.            .            ==            .            March 9, 2017            .            PCP: Dr. Ken Wilson [facility]             Card: Dr. Pernell Kirkpatrick [facility]            CC: "DM2" (2001) ?NALLELY             +Anti-HARRY 65 ab & daughter DM1             Low vitamin B12 (neg intrinsic factor and parietal cell ab 2014)             (Low WBC)            .            Medications currently include:            .            Actos 30 mg in PM            glipizide ER 10 mg in PM            Onglyza 5 mg daily-            Metformin 1000 mg BID            .            Also taking metoprolol 25 mg in PM            .            Impression: Doing well.            Fingerstick BS in good range.            Hopefully this will be verified by A1c            .            Plan: Annual eye exam            .            ==            .            Nov 10, 2016            .            PCP: Dr. Ken Wilson [facility]            CC: "DM2" (2001) ?NALLELY             +Anti-HARRY 65 ab & daughter DM1             Low vitamin B12 (neg intrinsic factor and parietal cell ab 2014)             (Low WBC)            .            Had panic attacks            Told of low K            Got too low with 2 glip in PM             .            Needs Onglyza.             On vit D and B12            .            Impression: Stable.            .            Plan: Annual eye exam            Continue:            Actos 30 mg in PM            glipizide ER 10 mg in PM            Onglyza 5 mg daily            Metformin 1000 mg BID            .            ==            .            July 21, 2016            .            PCP: Dr. Ken Wilson [facility]            CC: "DM2" (2001) ?NALLELY             +Anti-HARRY 65 ab & daughter DM1             Low vitamin B12 (neg intrinsic factor and parietal cell ab 2014)             (Low WBC)            .            Last visit advised him to increase PM glipizide ER 10 mg to two pills in AM, but he did not.             Did not bring records to this visit. (but he reports sugars in good range on current Rx.            .            Impression: Recent A1c of 6.2% suggests good control.            .            Plan: Continue glipizide ER 10 mg at one pill in PM along with the other medications for diabetes.             Updated A1c before he returns in3-4 mons.             .            ==            .            March 10, 2016            .            PCP: Dr. Ken Wilson [facility]            CC: "DM2" (2001)             +Anti-HARRY 65 ab & daughter DM1             Low vitamin B12 (neg intrinsic factor and parietal cell ab 2014)             (Low WBC)            3/3/2016 [facility] labs included            .             mg/dl            Fructosamine 327 (190-270)            .            HbA1c 7.3 %            .            Actos 30 mg in PM            glipizide ER 10 mg in PM            Onglyza 5 mg daily            Metformin 1000 mg BID            .            .            Plan: Next labs, check B12, Vit D, WBC, A1c            Because of elevated FBS can increase glipizide ER 10 mg to            two pills in PM.             .            "I feel fine, lost some weight...." -Lost 15 lbs since April.             .            .            ==            .            November 12, 2015            .            PCP: Dr. Ken Wilson [facility]            CC: "DM2" (2001)             +Anti-HARRY 65 ab & daughter DM1             Low vitamin B12 (neg intrinsic factor and parietal cell ab 2014)             (Low WBC)            .            Labs July 2 included A1c 7.2 %            B12 155 (180-9000)            WBC 2.4             .            Actos 30 mg in PM            glipizide ER 5 mg in PM            Onglyza 5 mg daily            Metformin 1000 mg BID            .            Impression: 15 yo daughter keeping him busy. She has insulin pump.            He has not been testing his own sugars.            .            Plan: Updated A1c. -jh            .            .            ==            .            July 2, 2015            .            .            PCP: Dr. Ken Wilson [facility]            CC: "DM2" (2001)             +Anti-HARRY 65 ab & daughter DM1            .            Actos 30 mg in PM            glipizide ER 5 mg in PM            Onglyza 5 mg daily            Metformin 1000 mg BID            .            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 Not clear if rise in the FBS is a sign of this, but will increase PM glipizide to 10 mg and ROV in 4 months.            .            .===            Nov 17, 2014            PCP: Dr. Ken Wilson [facility]            CC: "DM2" (2001)             +Anti-HARRY 65 ab & daughter DM1            .            Works in [employer] at [employer] - busy with Ebola currently.             .            Because of +HARRY 65 and daughter's DM1, I encouraged him to            see Dr. Imtiaz De La O at [facility] for advice to decrease chances of evolution from DM2 to DM1. He reports that Dr. De La O is not in his network. I will check with [facility] faculty for their advice:            [email or web address]            [phone]            .            Remains on:            Actos 30 mg in PM            glipizide ER 5 mg in PM            Onglyza 5 mg daily            Metformin 1000 mg BID.            .            Impression: Doing well. Last labs in May 6.1%            .            Plan: Same Rx. ROV in 4 months after updated labs. Thank you.            .            ===            .            May 27, 2014            PCP: Dr. Wilson            CC: DM2 (2001);            .            Last note appended below.            On metformin 1000 mg BID            Onglyza 5 mg daily            glipizide XR 5 mg            Actos 30 mg in PM.            .            Fingerstick BS in good range. FBS about 110 mg/dl average.            Sees Ophthalmology at [facility].             .            Impression: His daughter has Type 1 Diabetes. His recent blood tests show post breakfast BS of 131 and C-peptide of 5.07 (0.8-3.1) at [facility] suggesting he currently makes considerable insulin and is insulin resistant. 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 For that reason, I suggested to him that he may benefit from evaluation at [facility] (Dr. Vegas fax [phone]) for possible study or strategy to decrease progression to DM1 and he will ask you advice for a referral in that regard. The Anti parietal cell ab is negative. B12 is borderline.              Thank you. -            .            =========            Dec 16, 2013            PCP: Dr. Bhardwaj/Dr. Faulkner            CC: DM2, since 2001, low B12            .            15 yo daughter has DM1 diagnosed age 9 and sees Dr. Erickson.             He stopped Actos prescriibedd by Dr. Silva b/o concern about bladder cancer. Dr. Silva had him on 30 mg and I restarted at 15 mg, but he wants the 30 mg Actos now that he is back on it.            .            I switched him to metformin XR 1000 mg BID, but he prefers the regular metformin as it is smaller and less expensive.            .            Because the FBS (and A1c) have drifted up a bit, I have advised he increase the PM glipizide 2.5 mg XR to 5 mg XR.            .            He will remain on 5 mg Onglyza every AM.            .            B12 is only 202.             Imp: Lower B12 may be a metformin effect.             .            Plan: As above.            .            ====            August 19, 2013            PCP: Dr. Ken Wilson/Dr. Faulkner            CC: DM2            .            Recent A1c again 5.9%            B12 again low            .            On Onglyza 5 mg            Actos 15 in PM            Metformin 1000 mg BID            glipizide ER 2.5 mg in PM            .            Brought NO records.             Impression: BS in good range.            Plan: Same Rx. ROV 4 months.            Get B12 shot at [facility].             .            ====            April 1, 2013 CC: Diabetes             Meds the same.            A1c is 5.9% B12 183.            Doing well. Highest BS is 200 after instant oatmeal.            Eyes checked recently at [facility].            Feels well.            He will hand deliver labs to Dr. Wilson            .            .            ++++++            Visits for DM2. Meds the same. Recent A1c 6.1%. Had recent eye exam at [facility]. Feels well. Previous note appended below:            .s.             .            "July 25, 2012 Re: Michael Moore [birth year]            Attn: Dr. Ken Wilson            Mr. Moore is 40 yo, father of a 21 and 15 yo, works at the [employer] in  and he returns regarding DM diagnosed in 2001. He is currently taking:            metformin 1000 mg BID            Onglyza 5 mg qAM            Actos 15 mg qPM             glipizide 2.5 mg qPM             Recent HbA1c is 6.1 (down from 8.0 in August but up from 5.8% last visit). However he did not bring in his records today.             No foot discomfort; eye exam a few month ago at [facility].             Weight today is 275 lbs fully clothed, 6 ft tall.            Impression: Diabetes appears to be under excellent control            Plan: Same Rx.             Sincerely,            James Hellerman MD            Endocrinology [address] [place] [id number]".

## 2024-02-01 RX ORDER — FOLIC ACID 1 MG/1
1 TABLET ORAL DAILY
Qty: 90 | Refills: 3 | Status: ACTIVE | COMMUNITY
Start: 2021-04-27 | End: 1900-01-01

## 2024-02-21 ENCOUNTER — RESULT REVIEW (OUTPATIENT)
Age: 54
End: 2024-02-21

## 2024-05-31 ENCOUNTER — NON-APPOINTMENT (OUTPATIENT)
Age: 54
End: 2024-05-31

## 2024-06-04 ENCOUNTER — APPOINTMENT (OUTPATIENT)
Dept: ENDOCRINOLOGY | Facility: CLINIC | Age: 54
End: 2024-06-04
Payer: COMMERCIAL

## 2024-06-04 VITALS
BODY MASS INDEX: 35.7 KG/M2 | OXYGEN SATURATION: 98 % | WEIGHT: 255 LBS | SYSTOLIC BLOOD PRESSURE: 114 MMHG | HEART RATE: 81 BPM | DIASTOLIC BLOOD PRESSURE: 80 MMHG | HEIGHT: 71 IN

## 2024-06-04 DIAGNOSIS — E11.9 TYPE 2 DIABETES MELLITUS W/OUT COMPLICATIONS: ICD-10-CM

## 2024-06-04 PROCEDURE — 99215 OFFICE O/P EST HI 40 MIN: CPT

## 2024-06-04 RX ORDER — SITAGLIPTIN 100 MG/1
100 TABLET, FILM COATED ORAL
Qty: 14 | Refills: 3 | Status: ACTIVE | COMMUNITY
Start: 2019-07-31 | End: 1900-01-01

## 2024-06-07 NOTE — HISTORY OF PRESENT ILLNESS
[FreeTextEntry1] : Jun 04, 2024    in  person  PCP: Dr. Brandon Faulkner c/o [facility]    Saw  2/2024  - noted umbilical hernia             Card: Dr. Pernell Kirkpatrick  had nuc stress test and lowered losartan dose              Eyes: Dr. Kylah Gonzales  -              Hematology:  Dr. Kylah Stafford (anemia)        Te, serum cortisol in good range.                CC: "Type 2 Diabetes" (2001) ?NALLELY   Reactive hypoglycemia**    7/2015 A1c 7.2%         4/2022  A1c 5.6 %             +Anti-HARRY 65 ab & daughter DM1             Low vitamin B12 (neg intrinsic factor and parietal cell ab 2014)             Low Vitamin D             (Low WBC)             (umbilical hernia -> Dr. Talon Kim  9/2024)   54 yo on B12, vit D and seeing Hematology b/o anemia.   Folic acid added by Dr. Rivera Tolliver 19 infection in August - now recuperated.  cytogenetics, molecular markers pending. Hct 31.6    (in 2015:  45.8) Reports he has been struggling to keep to his diet. To control blood sugar, he is taking:   metformin  1000 mg BID  pioglitazone 15 mg in PM (lowered from 30) Januvia 100 mg  (via [facility]) glipizide ER 10 mg in PM  Labs 5/28/24 ([facility]) included  creatinine 1.42 ***  (was 1.36 in January)       2/21/2024 US kidneys ([place]):  unremarkable  A1c 6.7% (up from 6.3 in 1/2024)  : : Constitutional:  Alert, well nourished, healthy appearance, no acute distress  Eyes:  No proptosis, no stare Thyroid: Pulmonary:  No respiratory distress, no accessory muscle use; normal rate and effort Cardiac:  Normal rate Vascular:  Endocrine:  No stigmata of Cushings Syndrome Musculoskeletal:  Normal gait, no involuntary movements Neurology:  No tremors Affect/Mood/Psych:  Oriented x 3; normal affect, normal insight/judgement, normal mood  . Impression:   Mild anemia, followed by Dr. Stafford and his PCP Diabetes under acceptable control.   Elevated creatinine - for some time -  may benefit from evaluation by Nephrology in the future.   Jan 22, 2024       in person  PCP: Dr. Brandon Faulkner c/o [facility]    Saw Nov 2022 for CPX               Card: Dr. Pernell Kirkpatrick  had nuc stress test and lowered losartan dose              Eyes: Dr. Kylah Gonzales  -              Hematology:  Dr. Kylah Stafford (anemia)        Te, serum cortisol in good range.                CC: "Type 2 Diabetes" (2001) ?NALLELY   Reactive hypoglycemia**    7/2015 A1c 7.2%         4/2022  A1c 5.6 %             +Anti-HARRY 65 ab & daughter DM1             Low vitamin B12 (neg intrinsic factor and parietal cell ab 2014)             Low Vitamin D             (Low WBC)  54 yo on B12, vit D and seeing Hematology b/o anemia.   Folic acid added by Dr. Rivera Tolliver 19 infection in August - now recuperated.  cytogenetics, molecular markers pending. Hct 31.6    (in 2015:  45.8)  To control blood sugar, he is taking:   metformin  1000 mg BID  pioglitazone 15 mg in PM (lowered from 30) Januvia 100 mg  (via [facility]) glipizide ER 10 mg in PM  : : Constitutional:  Alert, well nourished, healthy appearance, no acute distress  Eyes:  No proptosis, no stare Thyroid: Pulmonary:  No respiratory distress, no accessory muscle use; normal rate and effort Cardiac:  Normal rate Vascular:  Endocrine:  No stigmata of Cushings Syndrome Musculoskeletal:  Normal gait, no involuntary movements Neurology:  No tremors Affect/Mood/Psych:  Oriented x 3; normal affect, normal insight/judgement, normal mood  . Imp:    1/13/204 [facility] labs: FBS 18; creat 1.36  * testosteron 422 cortisol 21.9    (no endocrine contribution to anemia)     Saw ophthalmology  Dr. Gonzales - given good report.  Plan:  Updated labs prior to next visit. He will see Dr. Faulkner in February.    kidneys at [place]     May 12, 2023   nip               mother in [facility] receiving chemotherapy  PCP: Dr. Brandon Faulkner c/o [facility]    Saw Nov 2022 for CPX               Card: Dr. Pernell Kirkpatrick  had nuc stress test and lowered losartan dose              Eyes: Dr. Kylah Gonzales  -              Hematology:  Dr. Kylah Stafford (anemia)        Te, serum cortisol in good range.                CC: "Type 2 Diabetes" (2001) ?NALLELY   Reactive hypoglycemia**    7/2015 A1c 7.2%         4/2022  A1c 5.6 %             +Anti-HARRY 65 ab & daughter DM1             Low vitamin B12 (neg intrinsic factor and parietal cell ab 2014)             Low Vitamin D             (Low WBC)  51 yo on B12, vit D and seeing Hematology b/o anemia.   Folic acid added by Dr. Stafford    Covid 19 infection in August - now recuperated.  cytogenetics, molecular markers pending. Hct 31.6    (in 2015:  45.8)  To control blood sugar, he is taking:   metformin  1000 mg BID  pioglitazone 15 mg in PM (lowered from 30) Januvia 100 mg  (via [facility]) glipizide ER 10 mg in PM  Impression:  He is doing excellent job of controlling sugars. Plan:  Same Rx for now. KAMILA here by January.    Nov 04, 2022       - Videochat using [employer]   PCP: Dr. Brandon Faulkner c/o [facility]    Saw Nov 2022 for CPX               Card: Dr. Pernell Kirkpatrick [facility]              Eyes: Dr. Kylah Gonzales   - will see             Hematology:  Dr. Kylah Stafford (anemia)        Te, serum cortisol in goo range.                CC: "Type 2 Diabetes" (2001) ?NALLELY   Reactive hypoglycemia**    7/2015 A1c 7.2%         4/2022  A1c 5.6 %             +Anti-HARRY 65 ab & daughter DM1             Low vitamin B12 (neg intrinsic factor and parietal cell ab 2014)             Low Vitamin D             (Low WBC)  Covid 19 infection in August - now recuperated.  cytogenetics, molecular markers pending. Hct 31.6    (in 2015:  45.8)  metformin  1000 mg BID  pioglitazone 15 mg in PM (lowered from 30) Januvia 100 mg  (via [facility]) glipizide ER 10 mg in PM  5/5/23 [facility]  A1c 5.6 % (likely enhanced by hematology issues/turnover) fructosamine 288 (205-285)   mg/dl creatinine 1.27 25 OH vit D 51   Imp:  BS in good range. Still needs to see ophthalmology. Will see Dr. Gonzales soon.   F/u to Dr. CRISTINA TREVIÑO here in April     Went for bone marrow last week  Impression: Checking fingerstick BS      May 06, 2022      [place]  PCP: Dr. Brandon Faulkner c/o [facility]    Saw Nov 2022 for CPX               Card: Dr. Pernell Kirkpatrick [facility]              Eyes: Dr. Kylah Gonzales   - Sept 2020              Hematology:  Dr. Kylah Stafford (anemia)        Te, serum cortisol in goo range.                CC: "Type 2 Diabetes" (2001) ?NALLELY   Reactive hypoglycemia**    7/2015 A1c 7.2%         4/2022  A1c 5.6 %             +Anti-HARRY 65 ab & daughter DM1             Low vitamin B12 (neg intrinsic factor and parietal cell ab 2014)             Low Vitamin D             (Low WBC)   metformin  1000 mg BID  pioglitazone 15 mg in PM (lowered from 30) Januvia 100 mg  (via [facility]) glipizide ER 10 mg in PM  He reports fingerstick BS in good range. No hypoglycemia.  Imp/Plan:  A1c excellent 5.6%  ROV in October.    Jan 06, 2022      Callie  PCP: Dr. Brandon Faulkner c/o [facility]    Saw Nov 2022 for CPX               Card: Dr. Pernell Kirkpatrick [facility]              Eyes: Dr. Kylah Gonzales   - Sept 2020              Hematology:  Dr. Kylah Stafford (anemia)             CC: "Type 2 Diabetes" (2001) ?NALLELY   Reactive hypoglycemia**             +Anti-HARRY 65 ab & daughter DM1             Low vitamin B12 (neg intrinsic factor and parietal cell ab 2014)             Low Vitamin D             (Low WBC)   metformin  1000 mg BID  pioglitazone 15 mg in PM (lowered from 30) Januvia 100 mg  (via [facility]) glipizide ER 10 mg in PM  He reports fingerstick BS in good range. No hypoglycemia.  Impression:  Diabetes in good control. Anemia being addressed by GI and Hematology. Plan:  Updated labs before May visit. Rx renewed.     Aug 05, 2021      Callie  PCP: Dr. Brandon Faulkner c/o [facility]    Feb 2020  and plans to see soon              Card: Dr. Pernell Kirkpatrick [facility]              Eyes: Dr. Kylah Gonzales   - Sept 2020              CC: "Type 2 Diabetes" (2001) ?NALLELY   Reactive hypoglycemia**             +Anti-HARRY 65 ab & daughter DM1             Low vitamin B12 (neg intrinsic factor and parietal cell ab 2014)             Low Vitamin D             (Low WBC)   metformin  1000 mg BID  pioglitazone 15 mg in PM (lowered from 30) Januavia 100 mg  (via [facility]) glipizide ER 10 mg in PM  Imp/Plan: Fingersticks in good range. ROV January after labs.    Mar 25, 2021     Videochat  PCP: Dr. Brandon Faulkner c/o [facility]    Feb 2020  and plans to see soon              Card: Dr. Pernell Kirkpatrick [facility]              Eyes: Dr. Kylah Gonzales   - Sept 2020              CC: "Type 2 Diabetes" (2001) ?NALLELY   Reactive hypoglycemia**             +Anti-HARRY 65 ab & daughter DM1             Low vitamin B12 (neg intrinsic factor and parietal cell ab 2014)             Low Vitamin D             (Low WBC)   metformin  1000 mg BID  pioglitazone 15 mg in PM (lowered from 30) Januavia 100 mg  - needs refill via [facility]  glipizide ER 10 mg in PM  Reports fingerstick sugars are doing well.   3/16/2021  [facility]  LDL 79 on atorvastatin glucose 88 mg/dl A1c 5.3 %     highest  after cereal  Hct 33.9  **   (had been ~37 in 2013)  25 OH vit D 39    Impression:  BS in excellent range. Reason for low Hct not apparent. May be stuttering slowly down. Will ask him to see Hematology regarcing the anemia. Updated labs before next visit     Feb 12, 2021    VideoChat    PCP: Dr. Brandon Faulkner c/o [facility]             Card: Dr. Pernell Kirkpatrick [facility]              Eyes: Dr. Kylah Gonzales             CC: "Type 2 Diabetes" (2001) ?NALLELY   Reactive hypoglycemia             +Anti-HARRY 65 ab & daughter DM1             Low vitamin B12 (neg intrinsic factor and parietal cell ab 2014)             Low Vitamin D             (Low WBC)   metformin  1000 mg BID  pioglitazone 15 mg in PM (lowered from 30) Januavia 100 mg  - needs refill via [facility]  glipizide ER 10 mg in PM     Apr 30, 2020  VideoChat [employer]   android  [phone]    PCP: Dr. Brandon Faulkner c/o [facility]             Card: Dr. Pernell orta/o [facility]              Eyes: Dr. Kylah Gonzales             CC: "Type 2 Diabetes" (2001) ?NALLELY   Reactive hypoglycemia             +Anti-HARRY 65 ab & daughter DM1             Low vitamin B12 (neg intrinsic factor and parietal cell ab 2014)             Low Vitamin D             (Low WBC)  Working from home  Recent [facility]  by Dr. Faulkner included. 2/25/2020 included glucose 108 mg/dl  - fasting  creat 0.99 ca 9.4 TP 6.8 alb 4.4 low alk phos *** WBC 4.1  Hct 35.9  **** MCV  87.6   A1c  6.3 *** HDL   46 tri   89 LDL  65 on atrovastatin  urine micro   9   - ratio iron - nl  ferritin 89   TSH 1.25 PSA  0.5   For diabetes, he is taking:   metformin  1000 mg BID  pioglitazone 15 mg in PM (lowered from 30) Januavia 100 mg  - needs refill via [facility]  glipizide ER 10 mg in PM   Oct 17, 2019 PCP: Dr. Brandon Faulkner c/o [facility]             Card: Dr. Pernell Kirkpatrick [facility]              Eyes: Dr. Kylah Gonzales             CC: "Type 2 Diabetes" (2001) ?NALLELY   Reactive hypoglycemia             +Anti-HARRY 65 ab & daughter DM1             Low vitamin B12 (neg intrinsic factor and parietal cell ab 2014)             Low Vitamin D             (Low WBC)  Feels well. Recent CBC at [facility] shows Hct 36and at [place] in July 2015 Hct was 46.8. He will discuss with Dr. Faulkner at upcoming visit. ROV here for diabetes in February 2020.       April 26, 2019      PCP: Dr. Brandon Faulkner c/o [facility]             Card: Dr. Pernell Kirkpatrick [facility]              Eyes: Dr. Kylah Gonzales             CC: "Type 2 Diabetes" (2001) ?NALLELY   Reactive hypoglycemia             +Anti-HARRY 65 ab & daughter DM1             Low vitamin B12 (neg intrinsic factor and parietal cell ab 2014)             Low Vitamin D             (Low WBC)  Gets reactive hypoglyemia  Needs refill on metformin  1000 mg BID via [facility] with pioglitazone 15 mg in PM (lowered from 30) Januavia 100 mg glipizid ER 10 mg in PM  Recent [facility] labs from 4/11/2019 A1c 5.8 %  mg/dl    Previous notes from [employer] appended below.   October 4, 2018            .            PCP: Dr. Brandon Faulkner c/o [facility]             Card: Dr. Pernell orta/o [facility]              Eyes: Dr. Kylah Gonzales            CC: "Type 2 Diabetes" (2001) ?NALLELY             +Anti-HARRY 65 ab & daughter DM1             Low vitamin B12 (neg intrinsic factor and parietal cell ab 2014)             Low Vitamin D             (Low WBC)            .            His valsartan was recalled.            .            Recent [facility] A1c 6.4 % (up from 6.1)            urine microalbumin             .            Actos dose being decreased from 30 to 15 mg in PM            Januvia will stay at 100 mg daily.             glipizide ER 10 mg in PM            metformin 1000 mg BID             .            .            Impression: Diabetes doing very well.             .            Plan: Same Rx.            .Annual eye exam            .            ==            .            May 17, 2018            .            PCP: Dr. Brandon Faulkner c/o [facility]             Card: Dr. Pernell Kirkpatrick [facility]              Eyes: Dr. Matt Alvarado at [facility]            CC: "Type 2 Diabetes" (2001) ?NALLELY             +Anti-HARRY 65 ab & daughter DM1             Low vitamin B12 (neg intrinsic factor and parietal cell ab 2014)             Low Vitamin D             (Low WBC)            .            INTERNET DOWN.            .            Doing well.            .            his phone is             Rx / Refills Continue Metformin HCl 1000MG, , 1 tablet twice a day              Continue GlipiZIDE XL 10 mg, , 1 tablet Once a day in PM              Continue Januvia 100 MG, , 1 tablet Once a day              Continue Actos 30 MG, , 1 tablet once a day in PM             .            Dec 14, 2017            .            PCP: Dr. Brandon Faulkner c/o [facility]             Card: Dr. Pernell Kirkpatrick [facility]              Eyes: Dr. Matt Alvarado at [facility]            CC: "Type 2 Diabetes" (2001) ?NALLELY             +Anti-HARRY 65 ab & daughter DM1             Low vitamin B12 (neg intrinsic factor and parietal cell ab 2014)             Low Vitamin D             (Low WBC)            .            Still takiing:.            .            Actos 30 mg in PM             glipizide ER 10 mg in PM            Onglyza 5 mg daily-> Januvia 100 mg daily            Metformin 1000 mg BID            .            Labs Nov 30 included            microal ratio 5            glucose 131 fasting             creatinine 1.01 **            HbA1c 6.1 %            .            Impression: Since Actos decreased from 30 to 15 mg the FBS drifted up a bit. Has been too busy to test.             .            Plan: Discussed Nav[facility].            Eye exam.            ROV 4 months.             .,            .            ==            .            August 10, 2017            .            PCP: Dr. Brandon Faulkner [facility]             Card: Dr. Pernell Kirkpatrick [facility]            CC: "DM2" (2001) ?NALLELY             +Anti-HARRY 65 ab & daughter DM1             Low vitamin B12 (neg intrinsic factor and parietal cell ab 2014)             Low Vitamin D             (Low WBC)            .            .            Actos 30 mg in PM             glipizide ER 10 mg in PM            Onglyza 5 mg daily-> Januvia 100 mg daily            Metformin 1000 mg BID            .,            Takes B12, Vit D OTC daily as well.             .            Goal will be to taper off of Actos.             .            Most recent labs ([facility]) from            7/27/2017            [id number] mg/dl            gastrin 31            gliadin ab neg            HbA1c 5.9 % *** (as high as 8.0 in August 2011)             .            Brings in fingerstick BS.            FBS .            Some LOW ac lunc 49, 56            .            Impression: Low AC lunch may be related to reactive to             oatmeal or delayed effect of PM glipizide ER.            .            Goal will be to taper off of Actos.            .            Plan: Increse exercise.            Decrease Actos to 15 mg.            ROV 3 months after labs.            .            ==            .            March 9, 2017            .            PCP: Dr. Ken Wilson [facility]             Card: Dr. Pernell Kirkpatrick [facility]            CC: "DM2" (2001) ?NALLELY             +Anti-HARRY 65 ab & daughter DM1             Low vitamin B12 (neg intrinsic factor and parietal cell ab 2014)             (Low WBC)            .            Medications currently include:            .            Actos 30 mg in PM            glipizide ER 10 mg in PM            Onglyza 5 mg daily-            Metformin 1000 mg BID            .            Also taking metoprolol 25 mg in PM            .            Impression: Doing well.            Fingerstick BS in good range.            Hopefully this will be verified by A1c            .            Plan: Annual eye exam            .            ==            .            Nov 10, 2016            .            PCP: Dr. Ken Wilson [facility]            CC: "DM2" (2001) ?NALLELY             +Anti-HARRY 65 ab & daughter DM1             Low vitamin B12 (neg intrinsic factor and parietal cell ab 2014)             (Low WBC)            .            Had panic attacks            Told of low K            Got too low with 2 glip in PM             .            Needs Onglyza.             On vit D and B12            .            Impression: Stable.            .            Plan: Annual eye exam            Continue:            Actos 30 mg in PM            glipizide ER 10 mg in PM            Onglyza 5 mg daily            Metformin 1000 mg BID            .            ==            .            July 21, 2016            .            PCP: Dr. Ken Wilson [facility]            CC: "DM2" (2001) ?NALLELY             +Anti-HARRY 65 ab & daughter DM1             Low vitamin B12 (neg intrinsic factor and parietal cell ab 2014)             (Low WBC)            .            Last visit advised him to increase PM glipizide ER 10 mg to two pills in AM, but he did not.             Did not bring records to this visit. (but he reports sugars in good range on current Rx.            .            Impression: Recent A1c of 6.2% suggests good control.            .            Plan: Continue glipizide ER 10 mg at one pill in PM along with the other medications for diabetes.             Updated A1c before he returns in3-4 mons.             .            ==            .            March 10, 2016            .            PCP: Dr. Ken Wilson [facility]            CC: "DM2" (2001)             +Anti-HARRY 65 ab & daughter DM1             Low vitamin B12 (neg intrinsic factor and parietal cell ab 2014)             (Low WBC)            3/3/2016 [facility] labs included            .             mg/dl            Fructosamine 327 (190-270)            .            HbA1c 7.3 %            .            Actos 30 mg in PM            glipizide ER 10 mg in PM            Onglyza 5 mg daily            Metformin 1000 mg BID            .            .            Plan: Next labs, check B12, Vit D, WBC, A1c            Because of elevated FBS can increase glipizide ER 10 mg to            two pills in PM.             .            "I feel fine, lost some weight...." -Lost 15 lbs since April.             .            .            ==            .            November 12, 2015            .            PCP: Dr. Ken Wilson [facility]            CC: "DM2" (2001)             +Anti-HARRY 65 ab & daughter DM1             Low vitamin B12 (neg intrinsic factor and parietal cell ab 2014)             (Low WBC)            .            Labs July 2 included A1c 7.2 %            B12 155 (180-9000)            WBC 2.4             .            Actos 30 mg in PM            glipizide ER 5 mg in PM            Onglyza 5 mg daily            Metformin 1000 mg BID            .            Impression: 15 yo daughter keeping him busy. She has insulin pump.            He has not been testing his own sugars.            .            Plan: Updated A1c. -jh            .            .            ==            .            July 2, 2015            .            .            PCP: Dr. Ken Wilson [facility]            CC: "DM2" (2001)             +Anti-HARRY 65 ab & daughter DM1            .            Actos 30 mg in PM            glipizide ER 5 mg in PM            Onglyza 5 mg daily            Metformin 1000 mg BID            .            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 Not clear if rise in the FBS is a sign of this, but will increase PM glipizide to 10 mg and ROV in 4 months.            .            .===            Nov 17, 2014            PCP: Dr. Ken Wilson [facility]            CC: "DM2" (2001)             +Anti-HARRY 65 ab & daughter DM1            .            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             .            Because of +HARRY 65 and daughter's DM1, I encouraged him to            see Dr. Imtiaz De La O at [facility] for advice to decrease chances of evolution from DM2 to DM1. He reports that Dr. De La O is not in his network. I will check with [facility] faculty for their advice:            [email or web address]            [phone]            .            Remains on:            Actos 30 mg in PM            glipizide ER 5 mg in PM            Onglyza 5 mg daily            Metformin 1000 mg BID.            .            Impression: Doing well. Last labs in May 6.1%            .            Plan: Same Rx. ROV in 4 months after updated labs. Thank you.            .            ===            .            May 27, 2014            PCP: Dr. Wilson            CC: DM2 (2001);            .            Last note appended below.            On metformin 1000 mg BID            Onglyza 5 mg daily            glipizide XR 5 mg            Actos 30 mg in PM.            .            Fingerstick BS in good range. FBS about 110 mg/dl average.            Sees Ophthalmology at [facility].             .            Impression: His daughter has Type 1 Diabetes. His recent blood tests show post breakfast BS of 131 and C-peptide of 5.07 (0.8-3.1) at [facility] suggesting he currently makes considerable insulin and is insulin resistant. 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 For that reason, I suggested to him that he may benefit from evaluation at [facility] (Dr. Vegas fax [phone]) for possible study or strategy to decrease progression to DM1 and he will ask you advice for a referral in that regard. The Anti parietal cell ab is negative. B12 is borderline.              Thank you. -            .            =========            Dec 16, 2013            PCP: Dr. Bhardwaj/Dr. Faulkner            CC: DM2, since 2001, low B12            .            13 yo daughter has DM1 diagnosed age 9 and sees Dr. Erickson.             He stopped Actos prescriibedd by Dr. Silva b/o concern about bladder cancer. Dr. Silva had him on 30 mg and I restarted at 15 mg, but he wants the 30 mg Actos now that he is back on it.            .            I switched him to metformin XR 1000 mg BID, but he prefers the regular metformin as it is smaller and less expensive.            .            Because the FBS (and A1c) have drifted up a bit, I have advised he increase the PM glipizide 2.5 mg XR to 5 mg XR.            .            He will remain on 5 mg Onglyza every AM.            .            B12 is only 202.             Imp: Lower B12 may be a metformin effect.             .            Plan: As above.            .            ====            August 19, 2013            PCP: Dr. Ken Wilson/Dr. Faulkner            CC: DM2            .            Recent A1c again 5.9%            B12 again low            .            On Onglyza 5 mg            Actos 15 in PM            Metformin 1000 mg BID            glipizide ER 2.5 mg in PM            .            Brought NO records.             Impression: BS in good range.            Plan: Same Rx. ROV 4 months.            Get B12 shot at [facility].             .            ====            April 1, 2013 CC: Diabetes             Meds the same.            A1c is 5.9% B12 183.            Doing well. Highest BS is 200 after instant oatmeal.            Eyes checked recently at [facility].            Feels well.            He will hand deliver labs to Dr. Wilson            .            .            ++++++            Visits for DM2. Meds the same. Recent A1c 6.1%. Had recent eye exam at [facility]. Feels well. Previous note appended below:            .s.             .            "July 25, 2012 Re: Michael Moore [birth year]            Attn: Dr. Ken Wilson            Mr. Moore is 42 yo, father of a 21 and 15 yo, works at the [employer] in  and he returns regarding DM diagnosed in 2001. He is currently taking:            metformin 1000 mg BID            Onglyza 5 mg qAM            Actos 15 mg qPM             glipizide 2.5 mg qPM             Recent HbA1c is 6.1 (down from 8.0 in August but up from 5.8% last visit). However he did not bring in his records today.             No foot discomfort; eye exam a few month ago at [facility].             Weight today is 275 lbs fully clothed, 6 ft tall.            Impression: Diabetes appears to be under excellent control            Plan: Same Rx.             Sincerely,            James Hellerman MD            Endocrinology [address] [place] [address]".

## 2024-08-12 ENCOUNTER — RESULT REVIEW (OUTPATIENT)
Age: 54
End: 2024-08-12

## 2024-08-12 ENCOUNTER — APPOINTMENT (OUTPATIENT)
Dept: HEMATOLOGY ONCOLOGY | Facility: CLINIC | Age: 54
End: 2024-08-12
Payer: COMMERCIAL

## 2024-08-12 VITALS
HEART RATE: 64 BPM | TEMPERATURE: 97.9 F | OXYGEN SATURATION: 100 % | RESPIRATION RATE: 16 BRPM | DIASTOLIC BLOOD PRESSURE: 67 MMHG | HEIGHT: 71 IN | BODY MASS INDEX: 35.73 KG/M2 | SYSTOLIC BLOOD PRESSURE: 111 MMHG | WEIGHT: 255.19 LBS

## 2024-08-12 DIAGNOSIS — D64.9 ANEMIA, UNSPECIFIED: ICD-10-CM

## 2024-08-12 DIAGNOSIS — D57.3 SICKLE-CELL TRAIT: ICD-10-CM

## 2024-08-12 PROCEDURE — 99213 OFFICE O/P EST LOW 20 MIN: CPT | Mod: 25

## 2024-08-12 PROCEDURE — 36415 COLL VENOUS BLD VENIPUNCTURE: CPT

## 2024-08-13 NOTE — PHYSICAL EXAM
[Fully active, able to carry on all pre-disease performance without restriction] : Status 0 - Fully active, able to carry on all pre-disease performance without restriction [Normal] : affect appropriate [de-identified] : +umbilical hernia without signs of strangulation skin in tact, soft

## 2024-08-13 NOTE — HISTORY OF PRESENT ILLNESS
[de-identified] : Mr. Moore is a 52  year old man who presents for initial consultation of anemia.\par  \par  Labs 3/2021: hgb 11.5, hct 33.9, ferritin 113, iron sat 28%; he reports he thinks he has been anemic for about 20 years, he has received vitamin B12 injections; he denies needing PO iron, IV iron, and blood transfusions.\par  \par  He reports feeling dizzy when going from bent to standing, but otherwise well.\par  \par  Had COVID vaccine 2nd shot last Monday. 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 Faulkner ([facility])  [de-identified] : Patient seen and examined today for routine follow up. He was last seen 11/2023. Labs have been stable. He has upcoming surgery in 9/2024 for umbilical hernia repair with Dr. Darrin Kim at [facility]. He will be doing full PST and medical clearance with primary before this as well. Feels overall well without any issues.  Health Maintenance:  Endo: Dr. Martinez PCP: Dr. Faulkner  GI: Dr. Faulkner, last CNY 2022 1 benign polyp per patient with recommendation to return in 5 years

## 2024-08-13 NOTE — ASSESSMENT
[FreeTextEntry1] : Mr. 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 Normal karyotype. FISH normal. NGS with no mutations. Presents for routine follow up.  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 Continue supplementation - TSH wnl - ESR/CRP wnl - Immunofixation no monoclonal band - Given that he is on Metformin will continue to check B12 - 9/20/2021 - vs and labs reviewed. Hgb stable 10.7. Will continue to monitor for now. - 1/24/2022 - Hgb 11.0 stable - 7/20/22 - Hgb 10.8. Given that he remains leukopenic and anemia will just make sure to rule out a primary BM disorder - 10/28/22 - Hgb 10.8. stable. BM bx today. - 11/18/22 - s/p BMBx 10/2022. BM Biopsy reviewed revealing trilineage hematopoiesis with maturation and increased iron stores. Normal karyotype. FISH normal. NGS with no mutations. - 5/15/23 - vs and CBC reviewed; WBC 4.71, Hgb 11.3, plt 219. Reviewed with patient improvement to WBC and Hgb. He remains asymptomatic. Reviewed labs done at [facility] 5/5/23. Re-reviewed BMBx. Continue to monitor. Iron studies, B12 pending. - 11/13/23 - vs and labs reviewed. labs drawn in office today. hgb 10.9 - stable - 8/12/24 vs and labs reviewed; Hgb stable at 11.4. Asymptomatic today. Leukopenia resolved over last two visits down to 4.36 today. No B symptoms. Continue to monitor closely. Advised to report any B symptoms.   #Umbilical Hernia Surgery  - Has PST and medical clearance upcoming  - scheduled 9/2024 with Dr. Darrin Kim at [facility] elective surgery for hernia repair  - Hgb and WBC stable   #DM 2 - A1C 5.3 - Controlled on Metformin, Glipizide, Pioglitizone, and Januvia - Continues to follow with Dr. Martinez, last seen 5/12/23. Note reviewed.  #HTN - Remains on Losartan and Metoprolol managed by PCP Dr. Faulkner - Continue follow up with PCP Dr. Faulkner  #HLD - On Atorvastatin managed by PCP Dr. Faulkner - Continue follow up as instructed with Dr. Faulkner  #Health Maintenance - CNY 2022 with Dr. Faulkner, 1 benign polyp per patient with recommendation to return in 5 year - Endo: Dr. Martinez - PCP: Dr. Faulkner  RTC in 5- 6 months with CBC with diff, CMP, iron, ferritin, b12, folate, ESR/CRP, vitamin D.    Case and management discussed with Dr. Stafford

## 2024-08-13 NOTE — HISTORY OF PRESENT ILLNESS
[de-identified] : Mr. Moore is a 52  year old man who presents for initial consultation of anemia.\par  \par  Labs 3/2021: hgb 11.5, hct 33.9, ferritin 113, iron sat 28%; he reports he thinks he has been anemic for about 20 years, he has received vitamin B12 injections; he denies needing PO iron, IV iron, and blood transfusions.\par  \par  He reports feeling dizzy when going from bent to standing, but otherwise well.\par  \par  Had COVID vaccine 2nd shot last Monday. 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 Faulkner ([facility])  [de-identified] : Patient seen and examined today for routine follow up. He was last seen 11/2023. Labs have been stable. He has upcoming surgery in 9/2024 for umbilical hernia repair with Dr. Darrin Kim at [facility]. He will be doing full PST and medical clearance with primary before this as well. Feels overall well without any issues.  Health Maintenance:  Endo: Dr. Martinze PCP: Dr. Faulkner  GI: Dr. Faulkner, last CNY 2022 1 benign polyp per patient with recommendation to return in 5 years

## 2024-08-13 NOTE — ASSESSMENT
[FreeTextEntry1] : Mr. 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 Normal karyotype. FISH normal. NGS with no mutations. Presents for routine follow up.  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 Continue supplementation - TSH wnl - ESR/CRP wnl - Immunofixation no monoclonal band - Given that he is on Metformin will continue to check B12 - 9/20/2021 - vs and labs reviewed. Hgb stable 10.7. Will continue to monitor for now. - 1/24/2022 - Hgb 11.0 stable - 7/20/22 - Hgb 10.8. Given that he remains leukopenic and anemia will just make sure to rule out a primary BM disorder - 10/28/22 - Hgb 10.8. stable. BM bx today. - 11/18/22 - s/p BMBx 10/2022. BM Biopsy reviewed revealing trilineage hematopoiesis with maturation and increased iron stores. Normal karyotype. FISH normal. NGS with no mutations. - 5/15/23 - vs and CBC reviewed; WBC 4.71, Hgb 11.3, plt 219. Reviewed with patient improvement to WBC and Hgb. He remains asymptomatic. Reviewed labs done at [facility] 5/5/23. Re-reviewed BMBx. Continue to monitor. Iron studies, B12 pending. - 11/13/23 - vs and labs reviewed. labs drawn in office today. hgb 10.9 - stable - 8/12/24 vs and labs reviewed; Hgb stable at 11.4. Asymptomatic today. Leukopenia resolved over last two visits down to 4.36 today. No B symptoms. Continue to monitor closely. Advised to report any B symptoms.   #Umbilical Hernia Surgery  - Has PST and medical clearance upcoming  - scheduled 9/2024 with Dr. Darrin Kim at [facility] elective surgery for hernia repair  - Hgb and WBC stable   #DM 2 - A1C 5.3 - Controlled on Metformin, Glipizide, Pioglitizone, and Januvia - Continues to follow with Dr. Martinez, last seen 5/12/23. Note reviewed.  #HTN - Remains on Losartan and Metoprolol managed by PCP Dr. Faulkner - Continue follow up with PCP Dr. Faulkner  #HLD - On Atorvastatin managed by PCP Dr. Faulkner - Continue follow up as instructed with Dr. Faulkner  #Health Maintenance - CNY 2022 with Dr. Faulkner, 1 benign polyp per patient with recommendation to return in 5 year - Endo: Dr. Martinez - PCP: Dr. Faulkner  RTC in 5- 6 months with CBC with diff, CMP, iron, ferritin, b12, folate, ESR/CRP, vitamin D.    Case and management discussed with Dr. Stafford

## 2024-08-13 NOTE — PHYSICAL EXAM
[Fully active, able to carry on all pre-disease performance without restriction] : Status 0 - Fully active, able to carry on all pre-disease performance without restriction [Normal] : affect appropriate [de-identified] : +umbilical hernia without signs of strangulation skin in tact, soft

## 2024-08-29 ENCOUNTER — NON-APPOINTMENT (OUTPATIENT)
Age: 54
End: 2024-08-29

## 2024-09-03 ENCOUNTER — APPOINTMENT (OUTPATIENT)
Dept: ENDOCRINOLOGY | Facility: CLINIC | Age: 54
End: 2024-09-03
Payer: COMMERCIAL

## 2024-09-03 VITALS
OXYGEN SATURATION: 99 % | DIASTOLIC BLOOD PRESSURE: 70 MMHG | SYSTOLIC BLOOD PRESSURE: 122 MMHG | WEIGHT: 250 LBS | BODY MASS INDEX: 35 KG/M2 | HEIGHT: 71 IN | HEART RATE: 70 BPM

## 2024-09-03 DIAGNOSIS — E11.9 TYPE 2 DIABETES MELLITUS W/OUT COMPLICATIONS: ICD-10-CM

## 2024-09-03 PROCEDURE — 99215 OFFICE O/P EST HI 40 MIN: CPT

## 2024-09-03 NOTE — HISTORY OF PRESENT ILLNESS
[FreeTextEntry1] : Sep 03, 2024    in person  PCP: Dr. Brandon Faulkner c/o [facility] [place]    Saw  2/2024  - noted umbilical hernia             Card: Dr. Pernell Kirkpatrick  had nuc stress test and lowered losartan dose              Eyes: Dr. Kylah Gonzales  -              Hematology:  Dr. Kylah Stafford (anemia)        Te, serum cortisol in good range.     Bone marrow Bx reassuring               CC: "Type 2 Diabetes" (2001) ?NALLELY   Reactive hypoglycemia**    7/2015 A1c 7.2%         4/2022  A1c 5.6 %             +Anti-HARRY 65 ab & daughter DM1             Low vitamin B12 (neg intrinsic factor and parietal cell ab 2014)             Low Vitamin D             (Low WBC) Low Hct  1/2024  AM cortisol 21.9;  testosterone 422, LH 6.3             (umbilical hernia -> Dr. Talon Kim  9/2024)   54 yo on B12, vit D and seeing Hematology b/o anemia.   Folic acid added by Dr. Rivera Tolliver 19 infection in August - now recuperated.  cytogenetics, molecular markers pending. Hct 31.6    (in 2015:  45.8) Reports he has been struggling to keep to his diet. To control blood sugar, he is taking:   metformin  1000 mg BID  pioglitazone 15 mg in PM (lowered from 30) Januvia 100 mg  (via [place]) glipizide ER 10 mg in PM  Labs 5/28/24 ([facility]) included  creatinine 1.42 ***  (was 1.36 in January)       2/21/2024 US kidneys ([place]):  unremarkable  A1c 6.7% (up from 6.3 in 1/2024)  Labs ([facility]) 8/2024: A1c 6.3 creatinine 1.39 Hct 35.2  : : Constitutional:  Alert, well nourished, healthy appearance, no acute distress  Eyes:  No proptosis, no stare Thyroid: Pulmonary:  No respiratory distress, no accessory muscle use; normal rate and effort Cardiac:  Normal rate Vascular:  Endocrine:  No stigmata of Cushings Syndrome Musculoskeletal:  Normal gait, no involuntary movements Neurology:  No tremors Affect/Mood/Psych:  Oriented x 3; normal affect, normal insight/judgement, normal mood  . Impression:   A1c confirms diabetes under good control.  He has GAD65 antibodies, daughter with DM1. Anemia under evaluation and treatment by Hematology - Dr. Stafford - evaluation showed nothing supicious. Today is essentially his first day of retiremen.    Plan  In view of recent creatinine 1.39, will ask for opinion of Nephrology. US kidneys, UA, unremarkable.    ROV Januaryl    Plan:  Same $Rx        Jun 04, 2024    in  person  PCP: Dr. Brandon Faulkner c/o [facility] [place]    Saw  2/2024  - noted umbilical hernia             Card: Dr. Pernell Kirkpatrick  had nuc stress test and lowered losartan dose              Eyes: Dr. Kylah Gonzales  -              Hematology:  Dr. Kylah Stafford (anemia)        Te, serum cortisol in good range.                CC: "Type 2 Diabetes" (2001) ?NALLELY   Reactive hypoglycemia**    7/2015 A1c 7.2%         4/2022  A1c 5.6 %             +Anti-HARRY 65 ab & daughter DM1             Low vitamin B12 (neg intrinsic factor and parietal cell ab 2014)             Low Vitamin D             (Low WBC)             (umbilical hernia -> Dr. Talon Kim  9/2024)   54 yo on B12, vit D and seeing Hematology b/o anemia.   Folic acid added by Dr. Stafford    Covid 19 infection in August - now recuperated.  cytogenetics, molecular markers pending. Hct 31.6    (in 2015:  45.8) Reports he has been struggling to keep to his diet. To control blood sugar, he is taking:   metformin  1000 mg BID  pioglitazone 15 mg in PM (lowered from 30) Januvia 100 mg  (via [place]) glipizide ER 10 mg in PM  Labs 5/28/24 ([facility]) included  creatinine 1.42 ***  (was 1.36 in January)       2/21/2024 US kidneys ([place]):  unremarkable  A1c 6.7% (up from 6.3 in 1/2024)  : : Constitutional:  Alert, well nourished, healthy appearance, no acute distress  Eyes:  No proptosis, no stare Thyroid: Pulmonary:  No respiratory distress, no accessory muscle use; normal rate and effort Cardiac:  Normal rate Vascular:  Endocrine:  No stigmata of Cushings Syndrome Musculoskeletal:  Normal gait, no involuntary movements Neurology:  No tremors Affect/Mood/Psych:  Oriented x 3; normal affect, normal insight/judgement, normal mood  . Impression:   Mild anemia, followed by Dr. Stafford and his PCP Diabetes under acceptable control.   Elevated creatinine - for some time -  may benefit from evaluation by Nephrology in the future.   Jan 22, 2024       in person  PCP: Dr. Brandon Faulkner c/o [facility] [place]    Saw Nov 2022 for CPX               Card: Dr. Pernell Kirkpatrick  had nuc stress test and lowered losartan dose              Eyes: Dr. Kylah Gonzales  -              Hematology:  Dr. Kylah Stafford (anemia)        Te, serum cortisol in good range.                CC: "Type 2 Diabetes" (2001) ?NALLELY   Reactive hypoglycemia**    7/2015 A1c 7.2%         4/2022  A1c 5.6 %             +Anti-HARRY 65 ab & daughter DM1             Low vitamin B12 (neg intrinsic factor and parietal cell ab 2014)             Low Vitamin D             (Low WBC)  54 yo on B12, vit D and seeing Hematology b/o anemia.   Folic acid added by Dr. Rivera Tolliver 19 infection in August - now recuperated.  cytogenetics, molecular markers pending. Hct 31.6    (in 2015:  45.8)  To control blood sugar, he is taking:   metformin  1000 mg BID  pioglitazone 15 mg in PM (lowered from 30) Januvia 100 mg  (via [place]) glipizide ER 10 mg in PM  : : Constitutional:  Alert, well nourished, healthy appearance, no acute distress  Eyes:  No proptosis, no stare Thyroid: Pulmonary:  No respiratory distress, no accessory muscle use; normal rate and effort Cardiac:  Normal rate Vascular:  Endocrine:  No stigmata of Cushings Syndrome Musculoskeletal:  Normal gait, no involuntary movements Neurology:  No tremors Affect/Mood/Psych:  Oriented x 3; normal affect, normal insight/judgement, normal mood  . Imp:    1/13/204 [facility] labs: FBS 18; creat 1.36  * testosteron 422 cortisol 21.9    (no endocrine contribution to anemia)     Saw ophthalmology  Dr. Gonzales - given good report.  Plan:  Updated labs prior to next visit. He will see Dr. Falukner in February.    kidneys at [facility]     May 12, 2023   nip               mother in [facility] receiving chemotherapy  PCP: Dr. Brandon Faulkner c/o [facility] [place]    Saw Nov 2022 for CPX               Card: Dr. Pernell Kirkpatrick  had nuc stress test and lowered losartan dose              Eyes: Dr. Kylah Gonzales  -              Hematology:  Dr. Kylah Stafford (anemia)        Te, serum cortisol in good range.                CC: "Type 2 Diabetes" (2001) ?NALLELY   Reactive hypoglycemia**    7/2015 A1c 7.2%         4/2022  A1c 5.6 %             +Anti-HARRY 65 ab & daughter DM1             Low vitamin B12 (neg intrinsic factor and parietal cell ab 2014)             Low Vitamin D             (Low WBC)  51 yo on B12, vit D and seeing Hematology b/o anemia.   Folic acid added by Dr. Stafford    Covid 19 infection in August - now recuperated.  cytogenetics, molecular markers pending. Hct 31.6    (in 2015:  45.8)  To control blood sugar, he is taking:   metformin  1000 mg BID  pioglitazone 15 mg in PM (lowered from 30) Januvia 100 mg  (via [place]) glipizide ER 10 mg in PM  Impression:  He is doing excellent job of controlling sugars. Plan:  Same Rx for now. ROV here by January.    Nov 04, 2022       - Videochat using [employer]   PCP: Dr. Brandon Faulkner c/o [facility] [place]    Saw Nov 2022 for CPX               Card: Dr. Pernell Kirkpatrick c/o [facility]              Eyes: Dr. Kylah Gonzales   - will see             Hematology:  Dr. Kylah Stafford (anemia)        Te, serum cortisol in goo range.                CC: "Type 2 Diabetes" (2001) ?NALLELY   Reactive hypoglycemia**    7/2015 A1c 7.2%         4/2022  A1c 5.6 %             +Anti-HARRY 65 ab & daughter DM1             Low vitamin B12 (neg intrinsic factor and parietal cell ab 2014)             Low Vitamin D             (Low WBC)  Covid 19 infection in August - now recuperated.  cytogenetics, molecular markers pending. Hct 31.6    (in 2015:  45.8)  metformin  1000 mg BID  pioglitazone 15 mg in PM (lowered from 30) Januvia 100 mg  (via [place]) glipizide ER 10 mg in PM  5/5/23 [facility]  A1c 5.6 % (likely enhanced by hematology issues/turnover) fructosamine 288 (205-285)   mg/dl creatinine 1.27 25 OH vit D 51   Imp:  BS in good range. Still needs to see ophthalmology. Will see Dr. Gonzales soon.   F/u to Dr. CRISTINA Stafford ROV here in April     Went for bone marrow last week  Impression: Checking fingerstick BS      May 06, 2022      [place]  PCP: Dr. Brandon Faulkner c/o [facility] [place]    Saw Nov 2022 for CPX               Card: Dr. Pernell Kirkpatrick c/o [facility]              Eyes: Dr. Kylah Gonzales   - Sept 2020              Hematology:  Dr. Kylah Stafford (anemia)        Te, serum cortisol in goo range.                CC: "Type 2 Diabetes" (2001) ?NALLELY   Reactive hypoglycemia**    7/2015 A1c 7.2%         4/2022  A1c 5.6 %             +Anti-HARRY 65 ab & daughter DM1             Low vitamin B12 (neg intrinsic factor and parietal cell ab 2014)             Low Vitamin D             (Low WBC)   metformin  1000 mg BID  pioglitazone 15 mg in PM (lowered from 30) Januvia 100 mg  (via [place]) glipizide ER 10 mg in PM  He reports fingerstick BS in good range. No hypoglycemia.  Imp/Plan:  A1c excellent 5.6%  ROV in October.    Jan 06, 2022      [place]  PCP: Dr. Brandon Faulkner c/o [facility] [place]    Saw Nov 2022 for CPX               Card: Dr. Pernell orta/o DOCs              Eyes: Dr. Kylah Gonzales   - Sept 2020              Hematology:  Dr. Kylah Stafford (anemia)             CC: "Type 2 Diabetes" (2001) ?NALLELY   Reactive hypoglycemia**             +Anti-HARRY 65 ab & daughter DM1             Low vitamin B12 (neg intrinsic factor and parietal cell ab 2014)             Low Vitamin D             (Low WBC)   metformin  1000 mg BID  pioglitazone 15 mg in PM (lowered from 30) Januvia 100 mg  (via [place]) glipizide ER 10 mg in PM  He reports fingerstick BS in good range. No hypoglycemia.  Impression:  Diabetes in good control. Anemia being addressed by GI and Hematology. Plan:  Updated labs before May visit. Rx renewed.     Aug 05, 2021      [place]  PCP: Dr. Brandon Faulkner c/o [facility] [place]    Feb 2020  and plans to see soon              Card: Dr. Pernell orta/o DOCs              Eyes: Dr. Kylah Gonzales   - Sept 2020              CC: "Type 2 Diabetes" (2001) ?NALLELY   Reactive hypoglycemia**             +Anti-HARRY 65 ab & daughter DM1             Low vitamin B12 (neg intrinsic factor and parietal cell ab 2014)             Low Vitamin D             (Low WBC)   metformin  1000 mg BID  pioglitazone 15 mg in PM (lowered from 30) Jan[place]avia 100 mg  (via [place]) glipizide ER 10 mg in PM  Imp/Plan: Fingersticks in good range. ROV January after labs.    Mar 25, 2021     Videochat  PCP: Dr. Brandon Faulkner c/o [facility] [place]    Feb 2020  and plans to see soon              Card: Dr. Pernell Kirkpatrick c/o [facility]              Eyes: Dr. Kylah Gonzales   - Sept 2020              CC: "Type 2 Diabetes" (2001) ?NALLELY   Reactive hypoglycemia**             +Anti-HARRY 65 ab & daughter DM1             Low vitamin B12 (neg intrinsic factor and parietal cell ab 2014)             Low Vitamin D             (Low WBC)   metformin  1000 mg BID  pioglitazone 15 mg in PM (lowered from 30) Januavia 100 mg  - needs refill via [place]  glipizide ER 10 mg in PM  Reports fingerstick sugars are doing well.   3/16/2021  [facility]  LDL 79 on atorvastatin glucose 88 mg/dl A1c 5.3 %     highest  after cereal  Hct 33.9  **   (had been ~37 in 2013)  25 OH vit D 39    Impression:  BS in excellent range. Reason for low Hct not apparent. May be stuttering slowly down. Will ask him to see Hematology regarcing the anemia. Updated labs before next visit     Feb 12, 2021    VideoChat    PCP: Dr. Brandon Faulkner c/o [facility]             Card: Dr. Pernell Kirkpatrick c/o DOCs              Eyes: Dr. Kylah Gonzales             CC: "Type 2 Diabetes" (2001) ?NALLELY   Reactive hypoglycemia             +Anti-HARRY 65 ab & daughter DM1             Low vitamin B12 (neg intrinsic factor and parietal cell ab 2014)             Low Vitamin D             (Low WBC)   metformin  1000 mg BID  pioglitazone 15 mg in PM (lowered from 30) Januavia 100 mg  - needs refill via [place]  glipizide ER 10 mg in PM     Apr 30, 2020  VideoChat [employer]   android  [phone]    PCP: Dr. Brandon Faulkner c/o [facility]             Card: Dr. Pernell Kirkpatrick c/o DOCs              Eyes: Dr. Kylah Gonzales             CC: "Type 2 Diabetes" (2001) ?NALLELY   Reactive hypoglycemia             +Anti-HARRY 65 ab & daughter DM1             Low vitamin B12 (neg intrinsic factor and parietal cell ab 2014)             Low Vitamin D             (Low WBC)  Working from home  Recent [facility]  by Dr. Faulkner included. 2/25/2020 included glucose 108 mg/dl  - fasting  creat 0.99 ca 9.4 TP 6.8 alb 4.4 low alk phos *** WBC 4.1  Hct 35.9  **** MCV  87.6   A1c  6.3 *** HDL   46 tri   89 LDL  65 on atrovastatin  urine micro   9   - ratio iron - nl  ferritin 89   TSH 1.25 PSA  0.5   For diabetes, he is taking:   metformin  1000 mg BID  pioglitazone 15 mg in PM (lowered from 30) Januavia 100 mg  - needs refill via [place]  glipizide ER 10 mg in PM   Oct 17, 2019 PCP: Dr. Brandon Faulkner c/o [facility] [place]             Card: Dr. Pernell Kirkpatrick c/o DOCs              Eyes: Dr. Kylah Gonzales             CC: "Type 2 Diabetes" (2001) ?NALLELY   Reactive hypoglycemia             +Anti-HARRY 65 ab & daughter DM1             Low vitamin B12 (neg intrinsic factor and parietal cell ab 2014)             Low Vitamin D             (Low WBC)  Feels well. Recent CBC at [facility] shows Hct 36and at [facility] in July 2015 Hct was 46.8. He will discuss with Dr. Faulkner at upcoming visit. ROV here for diabetes in February 2020.       April 26, 2019      PCP: Dr. Brandon Faulkner c/o [facility] [place]             Card: Dr. Pernell Kirkpatrick c/o DOCs              Eyes: Dr. Kylah Gonzales             CC: "Type 2 Diabetes" (2001) ?NALLELY   Reactive hypoglycemia             +Anti-HARRY 65 ab & daughter DM1             Low vitamin B12 (neg intrinsic factor and parietal cell ab 2014)             Low Vitamin D             (Low WBC)  Gets reactive hypoglyemia  Needs refill on metformin  1000 mg BID via [facility] [place] with pioglitazone 15 mg in PM (lowered from 30) Jan[place]avia 100 mg glipizid ER 10 mg in PM  Recent [facility] labs from 4/11/2019 A1c 5.8 %  mg/dl    Previous notes from [employer] appended below.   October 4, 2018            .            PCP: Dr. Brandon Faulkner c/o [facility] [place]             Card: Dr. Pernell Kirkpatrick c/o DOCs              Eyes: Dr. Kylha Gonzales            CC: "Type 2 Diabetes" (2001) ?NALLELY             +Anti-HARRY 65 ab & daughter DM1             Low vitamin B12 (neg intrinsic factor and parietal cell ab 2014)             Low Vitamin D             (Low WBC)            .            His valsartan was recalled.            .            Recent [facility] A1c 6.4 % (up from 6.1)            urine microalbumin             .            Actos dose being decreased from 30 to 15 mg in PM            Januvia will stay at 100 mg daily.             glipizide ER 10 mg in PM            metformin 1000 mg BID             .            .            Impression: Diabetes doing very well.             .            Plan: Same Rx.            .Annual eye exam            .            ==            .            May 17, 2018            .            PCP: Dr. Brandon Faulkner c/o [facility] [place]             Card: Dr. Pernell Kirkpatrick c/o DOCs              Eyes: Dr. Matt Alvarado at [facility]            CC: "Type 2 Diabetes" (2001) ?NALLELY             +Anti-HARRY 65 ab & daughter DM1             Low vitamin B12 (neg intrinsic factor and parietal cell ab 2014)             Low Vitamin D             (Low WBC)            .            INTERNET DOWN.            .            Doing well.            .            his phone is             Rx / Refills Continue Metformin HCl 1000MG, , 1 tablet twice a day              Continue GlipiZIDE XL 10 mg, , 1 tablet Once a day in PM              Continue Januvia 100 MG, , 1 tablet Once a day              Continue Actos 30 MG, , 1 tablet once a day in PM             .            Dec 14, 2017            .            PCP: Dr. Brandon Faulkner c/o [facility] [place]             Card: Dr. Pernell orta/o DOCs              Eyes: Dr. Matt Alvarado at [facility]            CC: "Type 2 Diabetes" (2001) ?NALLELY             +Anti-HARRY 65 ab & daughter DM1             Low vitamin B12 (neg intrinsic factor and parietal cell ab 2014)             Low Vitamin D             (Low WBC)            .            Still takiing:.            .            Actos 30 mg in PM             glipizide ER 10 mg in PM            Onglyza 5 mg daily-> Januvia 100 mg daily            Metformin 1000 mg BID            .            Labs Nov 30 included            microal ratio 5            glucose 131 fasting             creatinine 1.01 **            HbA1c 6.1 %            .            Impression: Since Actos decreased from 30 to 15 mg the FBS drifted up a bit. Has been too busy to test.             .            Plan: Discussed [facility].            Eye exam.            ROV 4 months.             .,            .            ==            .            August 10, 2017            .            PCP: Dr. Brandon Faulkner c/o [facility]             Card: Dr. Pernell Kirkpatrick c/o DOCs            CC: "DM2" (2001) ?NALLELY             +Anti-HARRY 65 ab & daughter DM1             Low vitamin B12 (neg intrinsic factor and parietal cell ab 2014)             Low Vitamin D             (Low WBC)            .            .            Actos 30 mg in PM             glipizide ER 10 mg in PM            Onglyza 5 mg daily-> Januvia 100 mg daily            Metformin 1000 mg BID            .,            Takes B12, Vit D OTC daily as well.             .            Goal will be to taper off of Actos.             .            Most recent labs ([facility]) from            7/27/2017            AGG020 mg/dl            gastrin 31            gliadin ab neg            HbA1c 5.9 % *** (as high as 8.0 in August 2011)             .            Brings in fingerstick BS.            FBS .            Some LOW ac lunc 49, 56            .            Impression: Low AC lunch may be related to reactive to             oatmeal or delayed effect of PM glipizide ER.            .            Goal will be to taper off of Actos.            .            Plan: Increse exercise.            Decrease Actos to 15 mg.            ROV 3 months after labs.            .            ==            .            March 9, 2017            .            PCP: Dr. Ken Wilson c/o [facility]             Card: Dr. Pernell Kirkpatrick c/o DOCs            CC: "DM2" (2001) ?NALLELY             +Anti-HARRY 65 ab & daughter DM1             Low vitamin B12 (neg intrinsic factor and parietal cell ab 2014)             (Low WBC)            .            Medications currently include:            .            Actos 30 mg in PM            glipizide ER 10 mg in PM            Onglyza 5 mg daily-            Metformin 1000 mg BID            .            Also taking metoprolol 25 mg in PM            .            Impression: Doing well.            Fingerstick BS in good range.            Hopefully this will be verified by A1c            .            Plan: Annual eye exam            .            ==            .            Nov 10, 2016            .            PCP: Dr. Ken Wilson c/o [facility]            CC: "DM2" (2001) ?NALLELY             +Anti-HARRY 65 ab & daughter DM1             Low vitamin B12 (neg intrinsic factor and parietal cell ab 2014)             (Low WBC)            .            Had panic attacks            Told of low K            Got too low with 2 glip in PM             .            Needs Onglyza.             On vit D and B12            .            Impression: Stable.            .            Plan: Annual eye exam            Continue:            Actos 30 mg in PM            glipizide ER 10 mg in PM            Onglyza 5 mg daily            Metformin 1000 mg BID            .            ==            .            July 21, 2016            .            PCP: Dr. Ken Wilson c/o [facility]s            CC: "DM2" (2001) ?NALLELY             +Anti-HARRY 65 ab & daughter DM1             Low vitamin B12 (neg intrinsic factor and parietal cell ab 2014)             (Low WBC)            .            Last visit advised him to increase PM glipizide ER 10 mg to two pills in AM, but he did not.             Did not bring records to this visit. (but he reports sugars in good range on current Rx.            .            Impression: Recent A1c of 6.2% suggests good control.            .            Plan: Continue glipizide ER 10 mg at one pill in PM along with the other medications for diabetes.             Updated A1c before he returns in3-4 mons.             .            ==            .            March 10, 2016            .            PCP: Dr. Ken Wilson c/o DOCs            CC: "DM2" (2001)             +Anti-HARRY 65 ab & daughter DM1             Low vitamin B12 (neg intrinsic factor and parietal cell ab 2014)             (Low WBC)            3/3/2016 [facility] labs included            .             mg/dl            Fructosamine 327 (190-270)            .            HbA1c 7.3 %            .            Actos 30 mg in PM            glipizide ER 10 mg in PM            Onglyza 5 mg daily            Metformin 1000 mg BID            .            .            Plan: Next labs, check B12, Vit D, WBC, A1c            Because of elevated FBS can increase glipizide ER 10 mg to            two pills in PM.             .            "I feel fine, lost some weight...." -Lost 15 lbs since April.             .            .            ==            .            November 12, 2015            .            PCP: Dr. Ken Wilson c/o DOCs            CC: "DM2" (2001)             +Anti-HARRY 65 ab & daughter DM1             Low vitamin B12 (neg intrinsic factor and parietal cell ab 2014)             (Low WBC)            .            Labs July 2 included A1c 7.2 %            B12 155 (180-9000)            WBC 2.4             .            Actos 30 mg in PM            glipizide ER 5 mg in PM            Onglyza 5 mg daily            Metformin 1000 mg BID            .            Impression: 15 yo daughter keeping him busy. She has insulin pump.            He has not been testing his own sugars.            .            Plan: Updated A1c. -jh            .            .            ==            .            July 2, 2015            .            .            PCP: Dr. Ken Wilson c/o DOCs            CC: "DM2" (2001)             +Anti-HARRY 65 ab & daughter DM1            .            Actos 30 mg in PM            glipizide ER 5 mg in PM            Onglyza 5 mg daily            Metformin 1000 mg BID            .            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 Not clear if rise in the FBS is a sign of this, but will increase PM glipizide to 10 mg and ROV in 4 months.            .            .===            Nov 17, 2014            PCP: Dr. Ken Wilson c/o [facility]            CC: "DM2" (2001)             +Anti-HARRY 65 ab & daughter DM1            .            Works in [employer] at [employer] - busy with Ebola currently.             .            Because of +HARRY 65 and daughter's DM1, I encouraged him to            see Dr. Imtiaz De La O at [facility] for advice to decrease chances of evolution from DM2 to DM1. He reports that Dr. De La O is not in his network. I will check with [facility] faculty for their advice:            [email or web address]            [phone]            .            Remains on:            Actos 30 mg in PM            glipizide ER 5 mg in PM            Onglyza 5 mg daily            Metformin 1000 mg BID.            .            Impression: Doing well. Last labs in May 6.1%            .            Plan: Same Rx. ROV in 4 months after updated labs. Thank you.            .            ===            .            May 27, 2014            PCP: Dr. Wilson            CC: DM2 (2001);            .            Last note appended below.            On metformin 1000 mg BID            Onglyza 5 mg daily            glipizide XR 5 mg            Actos 30 mg in PM.            .            Fingerstick BS in good range. FBS about 110 mg/dl average.            Sees Op[facility]mology at [facility].             .            Impression: His daughter has Type 1 Diabetes. His recent blood tests show post breakfast BS of 131 and C-peptide of 5.07 (0.8-3.1) at [facility] suggesting he currently makes considerable insulin and is insulin resistant. 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 For that reason, I suggested to him that he may benefit from evaluation at [facility] (Dr. Vegas fax [phone]) for possible study or strategy to decrease progression to DM1 and he will ask you advice for a referral in that regard. The Anti parietal cell ab is negative. B12 is borderline.              Thank you. -            .            =========            Dec 16, 2013            PCP: Dr. Bhardwaj/Dr. Faulkner            CC: DM2, since 2001, low B12            .            13 yo daughter has DM1 diagnosed age 9 and sees Dr. Erickson.             He stopped Actos prescriibedd by Dr. Silva b/o concern about bladder cancer. Dr. Silva had him on 30 mg and I restarted at 15 mg, but he wants the 30 mg Actos now that he is back on it.            .            I switched him to metformin XR 1000 mg BID, but he prefers the regular metformin as it is smaller and less expensive.            .            Because the FBS (and A1c) have drifted up a bit, I have advised he increase the PM glipizide 2.5 mg XR to 5 mg XR.            .            He will remain on 5 mg Onglyza every AM.            .            B12 is only 202.             Imp: Lower B12 may be a metformin effect.             .            Plan: As above.            .            ====            August 19, 2013            PCP: Dr. Ken Wilson/Dr. Faulkner            CC: DM2            .            Recent A1c again 5.9%            B12 again low            .            On Onglyza 5 mg            Actos 15 in PM            Metformin 1000 mg BID            glipizide ER 2.5 mg in PM            .            Brought NO records.             Impression: BS in good range.            Plan: Same Rx. ROV 4 months.            Get B12 shot at [facility].             .            ====            April 1, 2013 CC: Diabetes             Meds the same.            A1c is 5.9% B12 183.            Doing well. Highest BS is 200 after instant oatmeal.            Eyes checked recently at [facility].            Feels well.            He will hand deliver labs to Dr. Wilson            .            .            ++++++            Visits for DM2. Meds the same. Recent A1c 6.1%. Had recent eye exam at [facility]. Feels well. Previous note appended below:            .s.             .            "July 25, 2012 Re: Michael Moore [birth year]            Attn: Dr. Ken Wilson            Mr. Moore is 42 yo, father of a 21 and 15 yo, works at the [employer] in  and he returns regarding DM diagnosed in 2001. He is currently taking:            metformin 1000 mg BID            Onglyza 5 mg qAM            Actos 15 mg qPM             glipizide 2.5 mg qPM             Recent HbA1c is 6.1 (down from 8.0 in August but up from 5.8% last visit). However he did not bring in his records today.             No foot discomfort; eye exam a few month ago at [facility].             Weight today is 275 lbs fully clothed, 6 ft tall.            Impression: Diabetes appears to be under excellent control            Plan: Same Rx.             Sincerely,            James Hellerman MD            Endocrinology [address] [place] [id number]".

## 2024-09-11 ENCOUNTER — HOSPITAL ENCOUNTER (OUTPATIENT)
Dept: HOSPITAL 74 - JASU-SURG | Age: 54
Discharge: HOME | End: 2024-09-11
Attending: SURGERY
Payer: COMMERCIAL

## 2024-09-11 VITALS — TEMPERATURE: 97.7 F | HEART RATE: 69 BPM | DIASTOLIC BLOOD PRESSURE: 66 MMHG | SYSTOLIC BLOOD PRESSURE: 114 MMHG

## 2024-09-11 VITALS — RESPIRATION RATE: 18 BRPM

## 2024-09-11 VITALS — BODY MASS INDEX: 33.9 KG/M2

## 2024-09-11 DIAGNOSIS — K42.9: Primary | ICD-10-CM

## 2024-09-11 PROCEDURE — C1781 MESH (IMPLANTABLE): HCPCS

## 2024-09-11 PROCEDURE — 0WUF4JZ SUPPLEMENT ABDOMINAL WALL WITH SYNTHETIC SUBSTITUTE, PERCUTANEOUS ENDOSCOPIC APPROACH: ICD-10-PCS | Performed by: SURGERY

## 2024-09-11 RX ADMIN — BUPIVACAINE HYDROCHLORIDE ONE ML: 2.5 INJECTION, SOLUTION EPIDURAL; INFILTRATION; INTRACAUDAL; PERINEURAL at 00:00

## 2024-09-11 RX ADMIN — KETOROLAC TROMETHAMINE ONE MG: 30 INJECTION INTRAMUSCULAR; INTRAVENOUS at 10:21

## 2024-09-11 RX ADMIN — ACETAMINOPHEN ONE MG: 10 INJECTION, SOLUTION INTRAVENOUS at 10:25

## 2024-09-11 RX ADMIN — SODIUM CHLORIDE, POTASSIUM CHLORIDE, SODIUM LACTATE AND CALCIUM CHLORIDE SCH: 600; 310; 30; 20 INJECTION, SOLUTION INTRAVENOUS at 11:39

## 2024-09-11 RX ADMIN — BUPIVACAINE HYDROCHLORIDE ONE ML: 2.5 INJECTION, SOLUTION EPIDURAL; INFILTRATION; INTRACAUDAL; PERINEURAL at 08:25

## 2024-09-25 ENCOUNTER — RESULT REVIEW (OUTPATIENT)
Age: 54
End: 2024-09-25

## 2024-09-25 ENCOUNTER — APPOINTMENT (OUTPATIENT)
Dept: NEPHROLOGY | Facility: CLINIC | Age: 54
End: 2024-09-25
Payer: COMMERCIAL

## 2024-09-25 VITALS
BODY MASS INDEX: 35.14 KG/M2 | HEIGHT: 71 IN | OXYGEN SATURATION: 98 % | DIASTOLIC BLOOD PRESSURE: 63 MMHG | HEART RATE: 74 BPM | SYSTOLIC BLOOD PRESSURE: 116 MMHG | WEIGHT: 251 LBS

## 2024-09-25 DIAGNOSIS — D64.9 ANEMIA, UNSPECIFIED: ICD-10-CM

## 2024-09-25 DIAGNOSIS — I10 ESSENTIAL (PRIMARY) HYPERTENSION: ICD-10-CM

## 2024-09-25 DIAGNOSIS — N17.9 ACUTE KIDNEY FAILURE, UNSPECIFIED: ICD-10-CM

## 2024-09-25 DIAGNOSIS — E11.9 TYPE 2 DIABETES MELLITUS W/OUT COMPLICATIONS: ICD-10-CM

## 2024-09-25 PROCEDURE — 99204 OFFICE O/P NEW MOD 45 MIN: CPT

## 2024-09-25 RX ORDER — ATORVASTATIN CALCIUM 10 MG/1
10 TABLET, FILM COATED ORAL
Refills: 0 | Status: ACTIVE | COMMUNITY

## 2024-09-25 NOTE — PHYSICAL EXAM
[General Appearance - Alert] : alert [General Appearance - In No Acute Distress] : in no acute distress [] : no respiratory distress [Respiration, Rhythm And Depth] : normal respiratory rhythm and effort [Exaggerated Use Of Accessory Muscles For Inspiration] : no accessory muscle use [Auscultation Breath Sounds / Voice Sounds] : lungs were clear to auscultation bilaterally [Heart Rate And Rhythm] : heart rate was normal and rhythm regular [Heart Sounds] : normal S1 and S2 [Edema] : there was no peripheral edema [Abdomen Tenderness] : non-tender [Abdomen Soft] : soft [Abnormal Walk] : normal gait [Oriented To Time, Place, And Person] : oriented to person, place, and time

## 2024-09-25 NOTE — HISTORY OF PRESENT ILLNESS
[FreeTextEntry1] : Referred by Dr. Hellerman  Has hx of DM, no dx of HTN but stated was started on losartan years ago by his pcp for kidney protection.  Serum creatinine 1.3, egfr 70-80s since last year. Most recent labs in August prior to his surgery, serum creatinine increased 1.39. Denies any illness, new medications. Report good fluid inake ~ 2L a day.  He had an elective umbilical hernia repair 2 weeks Sept 11. No recent use of iv contrast No chronic nsaid use.

## 2024-09-25 NOTE — PHYSICAL EXAM
[General Appearance - Alert] : alert [General Appearance - In No Acute Distress] : in no acute distress [] : no respiratory distress [Respiration, Rhythm And Depth] : normal respiratory rhythm and effort [Exaggerated Use Of Accessory Muscles For Inspiration] : no accessory muscle use [Auscultation Breath Sounds / Voice Sounds] : lungs were clear to auscultation bilaterally [Heart Rate And Rhythm] : heart rate was normal and rhythm regular [Heart Sounds] : normal S1 and S2 [Edema] : there was no peripheral edema [Abdomen Soft] : soft [Abdomen Tenderness] : non-tender [Oriented To Time, Place, And Person] : oriented to person, place, and time [Abnormal Walk] : normal gait

## 2024-09-25 NOTE — ASSESSMENT
[FreeTextEntry1] : # BECCA  no clear cause for new cr rise, no change in med, no sickness, no recent IVC.  - Scr 1.39, baseline 1.3 -High risk for ckd, from DM. DM controlled, most recent A1c ~ 6 - Renal US , normal size kidney, no HN Left renal cyst 2.3 cm, likely benign, will monitor for now. - Avoid nephrotoxic med. keep good hydration - Repeat lab bmp, ua, upcr -UPCR 0.2g , cont losartan 25 mg daily

## 2024-12-11 ENCOUNTER — RESULT REVIEW (OUTPATIENT)
Age: 54
End: 2024-12-11

## 2024-12-11 ENCOUNTER — APPOINTMENT (OUTPATIENT)
Dept: NEPHROLOGY | Facility: CLINIC | Age: 54
End: 2024-12-11
Payer: COMMERCIAL

## 2024-12-11 DIAGNOSIS — Z00.00 ENCOUNTER FOR GENERAL ADULT MEDICAL EXAMINATION W/OUT ABNORMAL FINDINGS: ICD-10-CM

## 2024-12-11 PROCEDURE — 36415 COLL VENOUS BLD VENIPUNCTURE: CPT

## 2024-12-20 ENCOUNTER — APPOINTMENT (OUTPATIENT)
Dept: NEPHROLOGY | Facility: CLINIC | Age: 54
End: 2024-12-20
Payer: COMMERCIAL

## 2024-12-20 VITALS
OXYGEN SATURATION: 98 % | DIASTOLIC BLOOD PRESSURE: 80 MMHG | HEART RATE: 77 BPM | BODY MASS INDEX: 34.86 KG/M2 | SYSTOLIC BLOOD PRESSURE: 110 MMHG | HEIGHT: 71 IN | WEIGHT: 249 LBS

## 2024-12-20 DIAGNOSIS — E11.9 TYPE 2 DIABETES MELLITUS W/OUT COMPLICATIONS: ICD-10-CM

## 2024-12-20 DIAGNOSIS — N17.9 ACUTE KIDNEY FAILURE, UNSPECIFIED: ICD-10-CM

## 2024-12-20 DIAGNOSIS — D64.9 ANEMIA, UNSPECIFIED: ICD-10-CM

## 2024-12-20 DIAGNOSIS — I10 ESSENTIAL (PRIMARY) HYPERTENSION: ICD-10-CM

## 2024-12-20 PROCEDURE — 99214 OFFICE O/P EST MOD 30 MIN: CPT

## 2025-01-27 ENCOUNTER — RESULT REVIEW (OUTPATIENT)
Age: 55
End: 2025-01-27

## 2025-01-27 ENCOUNTER — NON-APPOINTMENT (OUTPATIENT)
Age: 55
End: 2025-01-27

## 2025-01-27 ENCOUNTER — APPOINTMENT (OUTPATIENT)
Dept: HEMATOLOGY ONCOLOGY | Facility: CLINIC | Age: 55
End: 2025-01-27
Payer: COMMERCIAL

## 2025-01-27 VITALS
HEIGHT: 71 IN | SYSTOLIC BLOOD PRESSURE: 112 MMHG | WEIGHT: 256.25 LBS | OXYGEN SATURATION: 98 % | TEMPERATURE: 97.2 F | BODY MASS INDEX: 35.87 KG/M2 | DIASTOLIC BLOOD PRESSURE: 66 MMHG | RESPIRATION RATE: 16 BRPM | HEART RATE: 79 BPM

## 2025-01-27 DIAGNOSIS — D57.3 SICKLE-CELL TRAIT: ICD-10-CM

## 2025-01-27 DIAGNOSIS — D72.819 DECREASED WHITE BLOOD CELL COUNT, UNSPECIFIED: ICD-10-CM

## 2025-01-27 DIAGNOSIS — D64.9 ANEMIA, UNSPECIFIED: ICD-10-CM

## 2025-01-27 PROCEDURE — 99213 OFFICE O/P EST LOW 20 MIN: CPT | Mod: 25

## 2025-01-27 PROCEDURE — 36415 COLL VENOUS BLD VENIPUNCTURE: CPT

## 2025-02-11 ENCOUNTER — APPOINTMENT (OUTPATIENT)
Dept: ENDOCRINOLOGY | Facility: CLINIC | Age: 55
End: 2025-02-11
Payer: COMMERCIAL

## 2025-02-11 VITALS
DIASTOLIC BLOOD PRESSURE: 76 MMHG | WEIGHT: 250 LBS | HEIGHT: 71 IN | SYSTOLIC BLOOD PRESSURE: 116 MMHG | OXYGEN SATURATION: 99 % | HEART RATE: 69 BPM | BODY MASS INDEX: 35 KG/M2

## 2025-02-11 DIAGNOSIS — E11.9 TYPE 2 DIABETES MELLITUS W/OUT COMPLICATIONS: ICD-10-CM

## 2025-02-11 PROCEDURE — 99214 OFFICE O/P EST MOD 30 MIN: CPT

## 2025-03-03 ENCOUNTER — APPOINTMENT (OUTPATIENT)
Dept: ORTHOPEDIC SURGERY | Facility: CLINIC | Age: 55
End: 2025-03-03
Payer: COMMERCIAL

## 2025-03-03 VITALS
TEMPERATURE: 97.5 F | HEART RATE: 73 BPM | WEIGHT: 250 LBS | BODY MASS INDEX: 35 KG/M2 | RESPIRATION RATE: 17 BRPM | HEIGHT: 71 IN | SYSTOLIC BLOOD PRESSURE: 119 MMHG | DIASTOLIC BLOOD PRESSURE: 85 MMHG | OXYGEN SATURATION: 99 %

## 2025-03-03 DIAGNOSIS — M53.3 SACROCOCCYGEAL DISORDERS, NOT ELSEWHERE CLASSIFIED: ICD-10-CM

## 2025-03-03 PROCEDURE — 72220 X-RAY EXAM SACRUM TAILBONE: CPT

## 2025-03-03 PROCEDURE — 99203 OFFICE O/P NEW LOW 30 MIN: CPT

## 2025-06-19 ENCOUNTER — APPOINTMENT (OUTPATIENT)
Dept: NEPHROLOGY | Facility: CLINIC | Age: 55
End: 2025-06-19
Payer: COMMERCIAL

## 2025-06-19 VITALS
WEIGHT: 246 LBS | HEART RATE: 69 BPM | BODY MASS INDEX: 33.32 KG/M2 | HEIGHT: 72 IN | SYSTOLIC BLOOD PRESSURE: 102 MMHG | OXYGEN SATURATION: 99 % | DIASTOLIC BLOOD PRESSURE: 60 MMHG

## 2025-06-19 PROBLEM — N18.9 CKD (CHRONIC KIDNEY DISEASE): Status: ACTIVE | Noted: 2025-06-19

## 2025-06-19 PROCEDURE — 99214 OFFICE O/P EST MOD 30 MIN: CPT

## 2025-08-04 ENCOUNTER — RESULT REVIEW (OUTPATIENT)
Age: 55
End: 2025-08-04

## 2025-08-04 ENCOUNTER — APPOINTMENT (OUTPATIENT)
Dept: HEMATOLOGY ONCOLOGY | Facility: CLINIC | Age: 55
End: 2025-08-04
Payer: COMMERCIAL

## 2025-08-04 VITALS
OXYGEN SATURATION: 99 % | SYSTOLIC BLOOD PRESSURE: 103 MMHG | DIASTOLIC BLOOD PRESSURE: 71 MMHG | TEMPERATURE: 96.6 F | HEART RATE: 70 BPM | BODY MASS INDEX: 34.06 KG/M2 | WEIGHT: 251.5 LBS | RESPIRATION RATE: 16 BRPM | HEIGHT: 72 IN

## 2025-08-04 DIAGNOSIS — D64.9 ANEMIA, UNSPECIFIED: ICD-10-CM

## 2025-08-04 DIAGNOSIS — D57.3 SICKLE-CELL TRAIT: ICD-10-CM

## 2025-08-04 PROCEDURE — 36415 COLL VENOUS BLD VENIPUNCTURE: CPT

## 2025-08-04 PROCEDURE — G2211 COMPLEX E/M VISIT ADD ON: CPT | Mod: NC

## 2025-08-04 PROCEDURE — 99214 OFFICE O/P EST MOD 30 MIN: CPT

## 2025-08-05 ENCOUNTER — NON-APPOINTMENT (OUTPATIENT)
Age: 55
End: 2025-08-05

## 2025-08-12 ENCOUNTER — APPOINTMENT (OUTPATIENT)
Dept: ENDOCRINOLOGY | Facility: CLINIC | Age: 55
End: 2025-08-12
Payer: COMMERCIAL

## 2025-08-12 VITALS
OXYGEN SATURATION: 99 % | WEIGHT: 250 LBS | BODY MASS INDEX: 33.86 KG/M2 | SYSTOLIC BLOOD PRESSURE: 112 MMHG | HEIGHT: 72 IN | DIASTOLIC BLOOD PRESSURE: 70 MMHG | HEART RATE: 76 BPM | TEMPERATURE: 98.1 F | RESPIRATION RATE: 16 BRPM

## 2025-08-12 DIAGNOSIS — E11.9 TYPE 2 DIABETES MELLITUS W/OUT COMPLICATIONS: ICD-10-CM

## 2025-08-12 PROCEDURE — 36415 COLL VENOUS BLD VENIPUNCTURE: CPT

## 2025-08-12 PROCEDURE — 99214 OFFICE O/P EST MOD 30 MIN: CPT

## 2025-09-15 LAB — HBA1C MFR BLD HPLC: 6.4
